# Patient Record
Sex: MALE | Race: WHITE | NOT HISPANIC OR LATINO | URBAN - METROPOLITAN AREA
[De-identification: names, ages, dates, MRNs, and addresses within clinical notes are randomized per-mention and may not be internally consistent; named-entity substitution may affect disease eponyms.]

---

## 2019-01-01 ENCOUNTER — INPATIENT (INPATIENT)
Facility: HOSPITAL | Age: 79
LOS: 2 days | Discharge: HOME | End: 2019-01-04
Attending: SURGERY | Admitting: SURGERY
Payer: MEDICARE

## 2019-01-01 VITALS
SYSTOLIC BLOOD PRESSURE: 158 MMHG | TEMPERATURE: 99 F | HEART RATE: 62 BPM | OXYGEN SATURATION: 99 % | DIASTOLIC BLOOD PRESSURE: 79 MMHG | RESPIRATION RATE: 18 BRPM

## 2019-01-01 DIAGNOSIS — Z98.890 OTHER SPECIFIED POSTPROCEDURAL STATES: Chronic | ICD-10-CM

## 2019-01-01 LAB
ALBUMIN SERPL ELPH-MCNC: 4.4 G/DL — SIGNIFICANT CHANGE UP (ref 3.5–5.2)
ALP SERPL-CCNC: 74 U/L — SIGNIFICANT CHANGE UP (ref 30–115)
ALT FLD-CCNC: 20 U/L — SIGNIFICANT CHANGE UP (ref 0–41)
ANION GAP SERPL CALC-SCNC: 14 MMOL/L — SIGNIFICANT CHANGE UP (ref 7–14)
AST SERPL-CCNC: 24 U/L — SIGNIFICANT CHANGE UP (ref 0–41)
BILIRUB SERPL-MCNC: 1.2 MG/DL — SIGNIFICANT CHANGE UP (ref 0.2–1.2)
BUN SERPL-MCNC: 14 MG/DL — SIGNIFICANT CHANGE UP (ref 10–20)
CALCIUM SERPL-MCNC: 10.2 MG/DL — HIGH (ref 8.5–10.1)
CHLORIDE SERPL-SCNC: 101 MMOL/L — SIGNIFICANT CHANGE UP (ref 98–110)
CO2 SERPL-SCNC: 27 MMOL/L — SIGNIFICANT CHANGE UP (ref 17–32)
CREAT SERPL-MCNC: 1.3 MG/DL — SIGNIFICANT CHANGE UP (ref 0.7–1.5)
GLUCOSE SERPL-MCNC: 147 MG/DL — HIGH (ref 70–99)
HCT VFR BLD CALC: 45.3 % — SIGNIFICANT CHANGE UP (ref 42–52)
HGB BLD-MCNC: 15.7 G/DL — SIGNIFICANT CHANGE UP (ref 14–18)
LACTATE SERPL-SCNC: 2.1 MMOL/L — SIGNIFICANT CHANGE UP (ref 0.5–2.2)
LIDOCAIN IGE QN: 40 U/L — SIGNIFICANT CHANGE UP (ref 7–60)
MCHC RBC-ENTMCNC: 31.6 PG — HIGH (ref 27–31)
MCHC RBC-ENTMCNC: 34.7 G/DL — SIGNIFICANT CHANGE UP (ref 32–37)
MCV RBC AUTO: 91.1 FL — SIGNIFICANT CHANGE UP (ref 80–94)
NRBC # BLD: 0 /100 WBCS — SIGNIFICANT CHANGE UP (ref 0–0)
PLATELET # BLD AUTO: 199 K/UL — SIGNIFICANT CHANGE UP (ref 130–400)
POTASSIUM SERPL-MCNC: 4.9 MMOL/L — SIGNIFICANT CHANGE UP (ref 3.5–5)
POTASSIUM SERPL-SCNC: 4.9 MMOL/L — SIGNIFICANT CHANGE UP (ref 3.5–5)
PROT SERPL-MCNC: 7.6 G/DL — SIGNIFICANT CHANGE UP (ref 6–8)
RBC # BLD: 4.97 M/UL — SIGNIFICANT CHANGE UP (ref 4.7–6.1)
RBC # FLD: 12.6 % — SIGNIFICANT CHANGE UP (ref 11.5–14.5)
SODIUM SERPL-SCNC: 142 MMOL/L — SIGNIFICANT CHANGE UP (ref 135–146)
TROPONIN T SERPL-MCNC: <0.01 NG/ML — SIGNIFICANT CHANGE UP
TYPE + AB SCN PNL BLD: SIGNIFICANT CHANGE UP
WBC # BLD: 10.14 K/UL — SIGNIFICANT CHANGE UP (ref 4.8–10.8)
WBC # FLD AUTO: 10.14 K/UL — SIGNIFICANT CHANGE UP (ref 4.8–10.8)

## 2019-01-01 PROCEDURE — 99291 CRITICAL CARE FIRST HOUR: CPT

## 2019-01-01 RX ORDER — CHLORHEXIDINE GLUCONATE 213 G/1000ML
1 SOLUTION TOPICAL
Qty: 0 | Refills: 0 | Status: DISCONTINUED | OUTPATIENT
Start: 2019-01-01 | End: 2019-01-04

## 2019-01-01 RX ORDER — HYDROMORPHONE HYDROCHLORIDE 2 MG/ML
0.5 INJECTION INTRAMUSCULAR; INTRAVENOUS; SUBCUTANEOUS EVERY 4 HOURS
Qty: 0 | Refills: 0 | Status: DISCONTINUED | OUTPATIENT
Start: 2019-01-01 | End: 2019-01-02

## 2019-01-01 RX ORDER — SODIUM CHLORIDE 9 MG/ML
1000 INJECTION INTRAMUSCULAR; INTRAVENOUS; SUBCUTANEOUS
Qty: 0 | Refills: 0 | Status: DISCONTINUED | OUTPATIENT
Start: 2019-01-01 | End: 2019-01-02

## 2019-01-01 RX ORDER — PANTOPRAZOLE SODIUM 20 MG/1
40 TABLET, DELAYED RELEASE ORAL DAILY
Qty: 0 | Refills: 0 | Status: DISCONTINUED | OUTPATIENT
Start: 2019-01-01 | End: 2019-01-02

## 2019-01-01 RX ORDER — MORPHINE SULFATE 50 MG/1
4 CAPSULE, EXTENDED RELEASE ORAL ONCE
Qty: 0 | Refills: 0 | Status: DISCONTINUED | OUTPATIENT
Start: 2019-01-01 | End: 2019-01-01

## 2019-01-01 RX ORDER — ESMOLOL HCL 100MG/10ML
50 VIAL (ML) INTRAVENOUS
Qty: 2500 | Refills: 0 | Status: DISCONTINUED | OUTPATIENT
Start: 2019-01-01 | End: 2019-01-01

## 2019-01-01 RX ORDER — ESMOLOL HCL 100MG/10ML
50 VIAL (ML) INTRAVENOUS
Qty: 2500 | Refills: 0 | Status: DISCONTINUED | OUTPATIENT
Start: 2019-01-01 | End: 2019-01-02

## 2019-01-01 RX ORDER — HYDROMORPHONE HYDROCHLORIDE 2 MG/ML
1 INJECTION INTRAMUSCULAR; INTRAVENOUS; SUBCUTANEOUS EVERY 4 HOURS
Qty: 0 | Refills: 0 | Status: DISCONTINUED | OUTPATIENT
Start: 2019-01-01 | End: 2019-01-02

## 2019-01-01 RX ORDER — NICARDIPINE HYDROCHLORIDE 30 MG/1
5 CAPSULE, EXTENDED RELEASE ORAL
Qty: 40 | Refills: 0 | Status: DISCONTINUED | OUTPATIENT
Start: 2019-01-01 | End: 2019-01-01

## 2019-01-01 RX ADMIN — MORPHINE SULFATE 4 MILLIGRAM(S): 50 CAPSULE, EXTENDED RELEASE ORAL at 12:46

## 2019-01-01 RX ADMIN — SODIUM CHLORIDE 75 MILLILITER(S): 9 INJECTION INTRAMUSCULAR; INTRAVENOUS; SUBCUTANEOUS at 18:25

## 2019-01-01 RX ADMIN — HYDROMORPHONE HYDROCHLORIDE 1 MILLIGRAM(S): 2 INJECTION INTRAMUSCULAR; INTRAVENOUS; SUBCUTANEOUS at 20:56

## 2019-01-01 RX ADMIN — NICARDIPINE HYDROCHLORIDE 25 MG/HR: 30 CAPSULE, EXTENDED RELEASE ORAL at 17:00

## 2019-01-01 RX ADMIN — Medication 25.5 MICROGRAM(S)/KG/MIN: at 20:05

## 2019-01-01 RX ADMIN — MORPHINE SULFATE 4 MILLIGRAM(S): 50 CAPSULE, EXTENDED RELEASE ORAL at 15:38

## 2019-01-01 RX ADMIN — HYDROMORPHONE HYDROCHLORIDE 1 MILLIGRAM(S): 2 INJECTION INTRAMUSCULAR; INTRAVENOUS; SUBCUTANEOUS at 21:15

## 2019-01-01 NOTE — ED PROVIDER NOTE - MEDICAL DECISION MAKING DETAILS
[facility]dr [facility], recommend gabriel, esmolol drip, t+s, npo. will move to crit. Spoke w dr mujica who accepts pt to sicu. dr orantes consulted sicu pa/team. pt found to have focal aortic dissection vs penetrating ulcer aorta; pt seen by vasc team/dr martin, recommend gabriel, esmolol drip, t+s, npo. will move to crit. Spoke w dr mujica who accepts pt to sicu. dr orantes consulted sicu pa/team.

## 2019-01-01 NOTE — ED PROCEDURE NOTE - ATTENDING CONTRIBUTION TO CARE
I was present for and supervised the key/critical aspects of the procedures performed during the care of the patient.

## 2019-01-01 NOTE — ED PROCEDURE NOTE - NS ED ATTENDING STATEMENT MOD
I have personally seen and examined this patient.  I have fully participated in the care of this patient. I have reviewed all pertinent clinical information, including history, physical exam, plan and the Resident’s note and agree except as noted.

## 2019-01-01 NOTE — CONSULT NOTE ADULT - SUBJECTIVE AND OBJECTIVE BOX
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 Avila in 2012,  presents with 2 hours of substernal chest pain that radiated to his mid back.  Now still having midsternal CP, described as tight, squeezing. Assocaietd with epigastric pain. Worse with deep inspiration. Denies NVCD, SOB, diaphoresis, extremities pain or weakness, HA. CT w/ dissection protocol demonstrating   Focal outpouching of the descending thoracic aorta measuring up to 2.5 cm at the level of the T6 vertebral body with a descending thoracic aorta intramural hematoma. These findings are consistent with a penetrating atherosclerotic ulcer versus a focal aortic dissection. Hemodynamically stable VS in /89, HR 80.      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 Avila in 2012    Home Medications: None    Allergies: NKDA    Intolerances    Soc:   Advanced Directives: Presumed Full Code     VITAL SIGNS, INS/OUTS (last 24 hours):  --------------------------------------------------------------------------------------  ICU Vital Signs Last 24 Hrs  T(C): 36.4 (01 Jan 2019 15:33), Max: 37.2 (01 Jan 2019 11:53)  T(F): 97.5 (01 Jan 2019 15:33), Max: 98.9 (01 Jan 2019 11:53)  HR: 85 (01 Jan 2019 17:00) (62 - 85)  BP: 150/65 (01 Jan 2019 17:00) (146/89 - 177/92)  RR: 16 (01 Jan 2019 17:00) (16 - 20)  SpO2: 100% (01 Jan 2019 17:00) (99% - 100%)    I&O's Summary  -------------------------------------------------------------------------------------    EXAM:  General/Neuro  GCS: 15  Exam: Normal, NAD, alert, oriented x 3, no focal deficits. PERRLA    Respiratory  Exam: Lungs clear to auscultation, Normal expansion/effort.      Cardiovascular  Exam: S1, S2.  Regular rate and rhythm. No Peripheral edema     GI  Exam: Abdomen soft, Non-tender, Non-distended.   Current Diet:  NPO    Extremities  Exam: Extremities moves all, +equal distal pulses, brisk cap refill, sensation wnl, normal ROM. No LE edema, calves      Tubes/Lines/Drains  ***  [X] Peripheral IV  [X] Arterial Line		[x] R		[x] Rad	Date Placed:  1-1-19          LABS  --------------------------------------------------------------------------------------                        15.7   10.14 )-----------( 199      ( 01 Jan 2019 12:15 )             45.3     01-01    142  |  101  |  14  ----------------------------<  147<H>  4.9   |  27  |  1.3    Ca    10.2<H>      01 Jan 2019 12:15    TPro  7.6  /  Alb  4.4  /  TBili  1.2  /  DBili  x   /  AST  24  /  ALT  20  /  AlkPhos  74  01-01    LIVER FUNCTIONS - ( 01 Jan 2019 12:15 )  Alb: 4.4 g/dL / Pro: 7.6 g/dL / ALK PHOS: 74 U/L / ALT: 20 U/L / AST: 24 U/L / GGT: x           CARDIAC MARKERS ( 01 Jan 2019 12:15 )  x     / <0.01 ng/mL / x     / x     / x        IMAGING RESULTS    CT Angio Chest Dissection Protocol (01.01.19 @ 14:20) >    	IMPRESSION:    	*Focal outpouching of the descending thoracic aorta measuring up to 2.5   	cm at the level of the T6 vertebral body with a descending thoracic aorta   	intramural hematoma. These findings are consistent with a penetrating   	atherosclerotic ulcer versus a focal aortic dissection.    	Status postaortic root aneurysm repair, with diffuse aneurysmal changes   	of the intrathoracic and intra-abdominal aorta as measured above.    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 Avila in 2012,  presents with 2 hours of substernal chest pain that radiated to his mid back.  Now still having midsternal CP, described as tight, squeezing. CT w/ dissection protocol demonstrating Focal outpouching of the descending thoracic aorta measuring up to 2.5 cm at the level of the T6 consistent with a penetrating atherosclerotic ulcer.     PLAN:    Neurologic: AAO x3, Pain control w/ PRN morphine     Respiratory: Saturating well on RA.     Cardiovascular: [place] in place, started on esmolol for tight BP control w/ SBP goal of <130.  Troponin:   <0.01 (01-01-19 @ 12:15)    Gastrointestinal/Nutrition: NPO, NS @ 100. GI Prophylaxis w/ Protonix 40mg Daily.    Renal/Genitourinary: Strict I&O, Replete electrolytes PRN    Hematologic: Hb stable 15.7, Serial CBCs. F/U Coags.     Infectious Disease: No Acute Dx, afebrile, WBC 10    Endocrine: No Acute Dx, Monitor FS q6h     Musculoskeletal: Bedrest.    Lines/Tubes: PIV, Rt Rad [place]    Disposition: Admit to SICU

## 2019-01-01 NOTE — H&P ADULT - NSHPPHYSICALEXAM_GEN_ALL_CORE
PHYSICAL EXAM:  GENERAL: NAD, sitting up in bed  HEENT: Normocephalic, atraumatic  CHEST/LUNG: Bilateral breath sounds  HEART: Regular rate and rhythm, nontender to chest wall palpation  ABDOMEN: Soft, Nondistended, Nontender  EXTREMITIES:  Moving all extremities, pulses throughout, no deformities

## 2019-01-01 NOTE — ED PROVIDER NOTE - PROGRESS NOTE DETAILS
FRANCHESCA CT surgery [place], states this is a vascualr issue. FRANCHESCA vascualr surgery, Deng. Aware of patient. OTW to see. spoke w dr godinez, will let dr calvo know pt. pt and family updated on results of CT and need for surgery eval FRANCHESCA fang, admission to SICU, NPO, type and screen, large bore IVs. BP control 100-130. Confirmed with Dr. Vilchis of vascular, wants BP control prioritized over HR. Will use Cardene.

## 2019-01-01 NOTE — ED PROVIDER NOTE - PHYSICAL EXAMINATION
AOx4, Non toxic appearing, NAD, speaking in full sentences.   Skin - warm and dry, no acute rash.   Head - normocephalic, atraumatic.   Eyes - PERRLA/EOMI, conjunctiva and sclera clear.   ENT- MM moist, no nasal discharge.  Pharynx unremarkable.  No mastoid or temporal ttp.   Neck - supple nt, no meningeal signs.   Heart - RRR s1s2 nl, no rub/murmur.   Lungs- No retractions, BS equal, CTAB.   Abdomen - soft, ttp in epigastrium, nd no r/g.   Back- no CVA tenderness.  Extremities- moves all, +equal distal pulses, brisk cap refill, sensation wnl, normal ROM. No LE edema, calves nttp b/l.

## 2019-01-01 NOTE — ED PROVIDER NOTE - ATTENDING CONTRIBUTION TO CARE
78M PMH aortic aneurysm repair 2012 dr valenzuela, p/w acute onset mid back pain that radiated to abdomen and then to chest. back and abd pain resolved, now only has chest pain. sharp constant pain. started when he layed down in bed. no fever, cough. no trauma. no le edema, le pain, hormones, hemoptysis, immobilization. last stress 2012 neg dr calvo cards. wife states pt was sweating when symptoms started. pain started 930am and they called neighbor who is an RN who checked vitals and told them to come to ED. pain is also present in epigastrium. no nvdc. no dysuria, freq, hematuria. no ha, numbness, weakness, dizziness.     on exam, AFVSS, well bradny nad, ncat, eomi, perrla, mmm, lctab, rrr nl s1s2 no mrg, abd soft mild epigastric/ruq ttp, no rebound or rigidity, nd, aaox3, no focal deficits, no midline c/t/l spine ttp or step off, normal gait, cn 2-12 intact, 5/5 motor x 4 ext, silt x4 ext, no facial droop or slurred speech, no le edema or calf ttp,     a/p; concern for aortic dissection vs acs, will do labs, ekg/trop, CXR, CTA, hold asa until after CT. morphine for pain control. re-eval. H&P not consistent w pe, pna, esoph perf, tamponade, ptx. will also do bedside sono r/o cholecystitis, r/o pericardial effusion, r/o free fluid in abd.

## 2019-01-01 NOTE — H&P ADULT - HISTORY OF PRESENT ILLNESS
Patient is a 79 yo M with PMH of AV repair with Dr. Avila in 2012 who presented today with 2 hours of substernal chest pain that radiated around from his mid back when it started and is now epigastric/substernal. He denies any SOB, diaphoresis, nausea, vomiting, or dizziness.

## 2019-01-01 NOTE — ED PROVIDER NOTE - OBJECTIVE STATEMENT
79 yo M with a hx of aortic root aneurysm s/p AV replacement presents with sudden onset chest pain. Patient states that 1-2 hours prior to arrival he turned over in bed and had sudden pain shooting from the mid back through to his chest. Now still having midsternal CP, described as tight, squeezing. Assocaietd with epigastric pain. Worse with deep inspiration. Denies NVCD, SOB, diaphoresis, extremities pain or weakness, HA.

## 2019-01-01 NOTE — H&P ADULT - ASSESSMENT
78 year old male with h/o AV repair in 2012 presents with 2 hours of chest pain with CT findings of penetrating aortic ulcer. He is amenable to treatment and will be admitted for observation and possible intervention if symptoms persist    Plan:  Admit to Dr. Don  Admit to ICU  2 large bore IVs  Aggressive blood pressure control with goals of 100-130 systolics   NPO   Labs including type and screen  Place arterial line

## 2019-01-01 NOTE — CONSULT NOTE ADULT - CONSULT REASON
Hemodynamic monitoring and BP control for descending thoracic aortic penetrating atherosclerotic ulcer

## 2019-01-01 NOTE — H&P ADULT - ATTENDING COMMENTS
Pt seen and examined, CTA reviewed.  C/O anterior chest and upper abdominal pain.  Hx MV repair.  CTA shows IMH with penetrating thoracic aortic ulcer, age undetermined.  Will admit to ICU for BP control and close observation.  Unclear whether pain is due to the aortic ulceration.  If pain persists will likely have to treat the patient with a TAG.

## 2019-01-01 NOTE — H&P ADULT - NSHPLABSRESULTS_GEN_ALL_CORE
Labs:                       15.7   10.14 )-----------( 199      ( 01 Jan 2019 12:15 )             45.3         01-01    142  |  101  |  14  ----------------------------<  147<H>  4.9   |  27  |  1.3      Calcium, Total Serum: 10.2 mg/dL (01-01-19 @ 12:15)    LFTs:             7.6  | 1.2  | 24       ------------------[74      ( 01 Jan 2019 12:15 )  4.4  | x    | 20          Lipase:40     Amylase:x        Lactate, Blood: 2.1 mmol/L (01-01-19 @ 12:15)    Coags:    CARDIAC MARKERS ( 01 Jan 2019 12:15 )  x     / <0.01 ng/mL / x     / x     / x            < from: CT Angio Chest Dissection Protocol (01.01.19 @ 14:20) >    IMPRESSION:    *Focal outpouching of the descending thoracic aorta measuring up to 2.5   cm at the level of the T6 vertebral body with a descending thoracic aorta   intramural hematoma. These findings are consistent with a penetrating   atherosclerotic ulcer versus a focal aortic dissection.    Status postaortic root aneurysm repair, with diffuse aneurysmal changes   of the intrathoracic and intra-abdominal aorta as measured above.    < end of copied text >

## 2019-01-02 LAB
ANION GAP SERPL CALC-SCNC: 13 MMOL/L — SIGNIFICANT CHANGE UP (ref 7–14)
ANION GAP SERPL CALC-SCNC: 15 MMOL/L — HIGH (ref 7–14)
APTT BLD: 29 SEC — SIGNIFICANT CHANGE UP (ref 27–39.2)
APTT BLD: 32.9 SEC — SIGNIFICANT CHANGE UP (ref 27–39.2)
BASOPHILS # BLD AUTO: 0.03 K/UL — SIGNIFICANT CHANGE UP (ref 0–0.2)
BASOPHILS NFR BLD AUTO: 0.3 % — SIGNIFICANT CHANGE UP (ref 0–1)
BUN SERPL-MCNC: 14 MG/DL — SIGNIFICANT CHANGE UP (ref 10–20)
BUN SERPL-MCNC: 16 MG/DL — SIGNIFICANT CHANGE UP (ref 10–20)
CALCIUM SERPL-MCNC: 8.6 MG/DL — SIGNIFICANT CHANGE UP (ref 8.5–10.1)
CALCIUM SERPL-MCNC: 9.2 MG/DL — SIGNIFICANT CHANGE UP (ref 8.5–10.1)
CHLORIDE SERPL-SCNC: 101 MMOL/L — SIGNIFICANT CHANGE UP (ref 98–110)
CHLORIDE SERPL-SCNC: 98 MMOL/L — SIGNIFICANT CHANGE UP (ref 98–110)
CO2 SERPL-SCNC: 25 MMOL/L — SIGNIFICANT CHANGE UP (ref 17–32)
CO2 SERPL-SCNC: 25 MMOL/L — SIGNIFICANT CHANGE UP (ref 17–32)
CREAT SERPL-MCNC: 1.3 MG/DL — SIGNIFICANT CHANGE UP (ref 0.7–1.5)
CREAT SERPL-MCNC: 1.3 MG/DL — SIGNIFICANT CHANGE UP (ref 0.7–1.5)
EOSINOPHIL # BLD AUTO: 0 K/UL — SIGNIFICANT CHANGE UP (ref 0–0.7)
EOSINOPHIL NFR BLD AUTO: 0 % — SIGNIFICANT CHANGE UP (ref 0–8)
GLUCOSE SERPL-MCNC: 124 MG/DL — HIGH (ref 70–99)
GLUCOSE SERPL-MCNC: 125 MG/DL — HIGH (ref 70–99)
HCT VFR BLD CALC: 38.2 % — LOW (ref 42–52)
HCT VFR BLD CALC: 41.4 % — LOW (ref 42–52)
HGB BLD-MCNC: 12.9 G/DL — LOW (ref 14–18)
HGB BLD-MCNC: 14.1 G/DL — SIGNIFICANT CHANGE UP (ref 14–18)
IMM GRANULOCYTES NFR BLD AUTO: 0.4 % — HIGH (ref 0.1–0.3)
INR BLD: 1.2 RATIO — SIGNIFICANT CHANGE UP (ref 0.65–1.3)
INR BLD: 1.35 RATIO — HIGH (ref 0.65–1.3)
LYMPHOCYTES # BLD AUTO: 1.19 K/UL — LOW (ref 1.2–3.4)
LYMPHOCYTES # BLD AUTO: 11.6 % — LOW (ref 20.5–51.1)
MAGNESIUM SERPL-MCNC: 1.8 MG/DL — SIGNIFICANT CHANGE UP (ref 1.8–2.4)
MAGNESIUM SERPL-MCNC: 2.1 MG/DL — SIGNIFICANT CHANGE UP (ref 1.8–2.4)
MCHC RBC-ENTMCNC: 31.2 PG — HIGH (ref 27–31)
MCHC RBC-ENTMCNC: 31.2 PG — HIGH (ref 27–31)
MCHC RBC-ENTMCNC: 33.8 G/DL — SIGNIFICANT CHANGE UP (ref 32–37)
MCHC RBC-ENTMCNC: 34.1 G/DL — SIGNIFICANT CHANGE UP (ref 32–37)
MCV RBC AUTO: 91.6 FL — SIGNIFICANT CHANGE UP (ref 80–94)
MCV RBC AUTO: 92.5 FL — SIGNIFICANT CHANGE UP (ref 80–94)
MONOCYTES # BLD AUTO: 0.82 K/UL — HIGH (ref 0.1–0.6)
MONOCYTES NFR BLD AUTO: 8 % — SIGNIFICANT CHANGE UP (ref 1.7–9.3)
NEUTROPHILS # BLD AUTO: 8.22 K/UL — HIGH (ref 1.4–6.5)
NEUTROPHILS NFR BLD AUTO: 79.7 % — HIGH (ref 42.2–75.2)
NRBC # BLD: 0 /100 WBCS — SIGNIFICANT CHANGE UP (ref 0–0)
PHOSPHATE SERPL-MCNC: 2.4 MG/DL — SIGNIFICANT CHANGE UP (ref 2.1–4.9)
PHOSPHATE SERPL-MCNC: 3.2 MG/DL — SIGNIFICANT CHANGE UP (ref 2.1–4.9)
PLATELET # BLD AUTO: 156 K/UL — SIGNIFICANT CHANGE UP (ref 130–400)
PLATELET # BLD AUTO: 172 K/UL — SIGNIFICANT CHANGE UP (ref 130–400)
POTASSIUM SERPL-MCNC: 4.2 MMOL/L — SIGNIFICANT CHANGE UP (ref 3.5–5)
POTASSIUM SERPL-MCNC: 4.5 MMOL/L — SIGNIFICANT CHANGE UP (ref 3.5–5)
POTASSIUM SERPL-SCNC: 4.2 MMOL/L — SIGNIFICANT CHANGE UP (ref 3.5–5)
POTASSIUM SERPL-SCNC: 4.5 MMOL/L — SIGNIFICANT CHANGE UP (ref 3.5–5)
PROTHROM AB SERPL-ACNC: 13.8 SEC — HIGH (ref 9.95–12.87)
PROTHROM AB SERPL-ACNC: 15.5 SEC — HIGH (ref 9.95–12.87)
RBC # BLD: 4.13 M/UL — LOW (ref 4.7–6.1)
RBC # BLD: 4.52 M/UL — LOW (ref 4.7–6.1)
RBC # FLD: 12.7 % — SIGNIFICANT CHANGE UP (ref 11.5–14.5)
RBC # FLD: 12.8 % — SIGNIFICANT CHANGE UP (ref 11.5–14.5)
SODIUM SERPL-SCNC: 136 MMOL/L — SIGNIFICANT CHANGE UP (ref 135–146)
SODIUM SERPL-SCNC: 141 MMOL/L — SIGNIFICANT CHANGE UP (ref 135–146)
WBC # BLD: 10.3 K/UL — SIGNIFICANT CHANGE UP (ref 4.8–10.8)
WBC # BLD: 10.31 K/UL — SIGNIFICANT CHANGE UP (ref 4.8–10.8)
WBC # FLD AUTO: 10.3 K/UL — SIGNIFICANT CHANGE UP (ref 4.8–10.8)
WBC # FLD AUTO: 10.31 K/UL — SIGNIFICANT CHANGE UP (ref 4.8–10.8)

## 2019-01-02 PROCEDURE — 99233 SBSQ HOSP IP/OBS HIGH 50: CPT

## 2019-01-02 RX ORDER — SODIUM CHLORIDE 9 MG/ML
1000 INJECTION, SOLUTION INTRAVENOUS
Qty: 0 | Refills: 0 | Status: DISCONTINUED | OUTPATIENT
Start: 2019-01-02 | End: 2019-01-03

## 2019-01-02 RX ORDER — LABETALOL HCL 100 MG
100 TABLET ORAL EVERY 8 HOURS
Qty: 0 | Refills: 0 | Status: DISCONTINUED | OUTPATIENT
Start: 2019-01-02 | End: 2019-01-02

## 2019-01-02 RX ORDER — OXYCODONE HYDROCHLORIDE 5 MG/1
5 TABLET ORAL EVERY 6 HOURS
Qty: 0 | Refills: 0 | Status: DISCONTINUED | OUTPATIENT
Start: 2019-01-02 | End: 2019-01-04

## 2019-01-02 RX ORDER — PANTOPRAZOLE SODIUM 20 MG/1
40 TABLET, DELAYED RELEASE ORAL
Qty: 0 | Refills: 0 | Status: DISCONTINUED | OUTPATIENT
Start: 2019-01-02 | End: 2019-01-04

## 2019-01-02 RX ORDER — LABETALOL HCL 100 MG
100 TABLET ORAL ONCE
Qty: 0 | Refills: 0 | Status: COMPLETED | OUTPATIENT
Start: 2019-01-02 | End: 2019-01-02

## 2019-01-02 RX ORDER — OXYCODONE HYDROCHLORIDE 5 MG/1
10 TABLET ORAL EVERY 6 HOURS
Qty: 0 | Refills: 0 | Status: DISCONTINUED | OUTPATIENT
Start: 2019-01-02 | End: 2019-01-04

## 2019-01-02 RX ORDER — LABETALOL HCL 100 MG
100 TABLET ORAL EVERY 8 HOURS
Qty: 0 | Refills: 0 | Status: DISCONTINUED | OUTPATIENT
Start: 2019-01-02 | End: 2019-01-03

## 2019-01-02 RX ORDER — ACETAMINOPHEN 500 MG
650 TABLET ORAL EVERY 6 HOURS
Qty: 0 | Refills: 0 | Status: DISCONTINUED | OUTPATIENT
Start: 2019-01-02 | End: 2019-01-04

## 2019-01-02 RX ORDER — METOPROLOL TARTRATE 50 MG
5 TABLET ORAL ONCE
Qty: 0 | Refills: 0 | Status: COMPLETED | OUTPATIENT
Start: 2019-01-02 | End: 2019-01-02

## 2019-01-02 RX ORDER — MAGNESIUM SULFATE 500 MG/ML
2 VIAL (ML) INJECTION ONCE
Qty: 0 | Refills: 0 | Status: COMPLETED | OUTPATIENT
Start: 2019-01-02 | End: 2019-01-02

## 2019-01-02 RX ORDER — LABETALOL HCL 100 MG
200 TABLET ORAL EVERY 8 HOURS
Qty: 0 | Refills: 0 | Status: DISCONTINUED | OUTPATIENT
Start: 2019-01-02 | End: 2019-01-02

## 2019-01-02 RX ADMIN — HYDROMORPHONE HYDROCHLORIDE 0.5 MILLIGRAM(S): 2 INJECTION INTRAMUSCULAR; INTRAVENOUS; SUBCUTANEOUS at 04:15

## 2019-01-02 RX ADMIN — OXYCODONE HYDROCHLORIDE 10 MILLIGRAM(S): 5 TABLET ORAL at 12:00

## 2019-01-02 RX ADMIN — Medication 100 MILLIGRAM(S): at 11:05

## 2019-01-02 RX ADMIN — OXYCODONE HYDROCHLORIDE 10 MILLIGRAM(S): 5 TABLET ORAL at 17:50

## 2019-01-02 RX ADMIN — HYDROMORPHONE HYDROCHLORIDE 0.5 MILLIGRAM(S): 2 INJECTION INTRAMUSCULAR; INTRAVENOUS; SUBCUTANEOUS at 03:55

## 2019-01-02 RX ADMIN — OXYCODONE HYDROCHLORIDE 10 MILLIGRAM(S): 5 TABLET ORAL at 11:05

## 2019-01-02 RX ADMIN — Medication 50 GRAM(S): at 06:58

## 2019-01-02 RX ADMIN — PANTOPRAZOLE SODIUM 40 MILLIGRAM(S): 20 TABLET, DELAYED RELEASE ORAL at 13:36

## 2019-01-02 RX ADMIN — OXYCODONE HYDROCHLORIDE 10 MILLIGRAM(S): 5 TABLET ORAL at 18:43

## 2019-01-02 RX ADMIN — OXYCODONE HYDROCHLORIDE 5 MILLIGRAM(S): 5 TABLET ORAL at 22:31

## 2019-01-02 RX ADMIN — Medication 5 MILLIGRAM(S): at 22:20

## 2019-01-02 RX ADMIN — OXYCODONE HYDROCHLORIDE 5 MILLIGRAM(S): 5 TABLET ORAL at 21:42

## 2019-01-02 RX ADMIN — Medication 100 MILLIGRAM(S): at 13:35

## 2019-01-02 RX ADMIN — Medication 100 MILLIGRAM(S): at 21:05

## 2019-01-02 RX ADMIN — CHLORHEXIDINE GLUCONATE 1 APPLICATION(S): 213 SOLUTION TOPICAL at 06:59

## 2019-01-02 NOTE — PROGRESS NOTE ADULT - ASSESSMENT
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 Avila in 2012,  presents with 2 hours of substernal chest pain that radiated to his mid back.  Now still having midsternal CP, described as tight, squeezing. CT w/ dissection protocol demonstrating Focal outpouching of the descending thoracic aorta measuring up to 2.5 cm at the level of the T6 consistent with a penetrating atherosclerotic ulcer.     PLAN:    Neurologic: AAO x3, Pain control w/ PRN morphine     Respiratory: Saturating well on RA.     Cardiovascular: [place] in place, started on esmolol for tight BP control w/ SBP goal of <130.  Troponin:   <0.01 (01-01-19 @ 12:15)    Gastrointestinal/Nutrition: NPO, NS @ 100. GI Prophylaxis w/ Protonix 40mg Daily.    Renal/Genitourinary: Strict I&O, Replete electrolytes PRN    Hematologic: Hb stable 15.7, Serial CBCs. F/U Coags.     Infectious Disease: No Acute Dx, afebrile, WBC 10    Endocrine: No Acute Dx, Monitor FS q6h     Musculoskeletal: Bedrest.    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 Avila in 2012,  presents with 2 hours of substernal chest pain that radiated to his mid back.  Now still having midsternal CP, described as tight, squeezing. CT w/ dissection protocol demonstrating Focal outpouching of the descending thoracic aorta measuring up to 2.5 cm at the level of the T6 consistent with a penetrating atherosclerotic ulcer.     PLAN:    Neurologic: AAO x3, Pain control w/ PRN morphine     Respiratory: Saturating well on RA.     Cardiovascular: [place] in place, started on esmolol for tight BP control w/ SBP goal of <130. will start labetalol and wean esmolol  Troponin:   <0.01 (01-01-19 @ 12:15)    Gastrointestinal/Nutrition: NPO, NS @ 100. GI Prophylaxis w/ Protonix 40mg Daily.    Renal/Genitourinary: Strict I&O, Replete electrolytes PRN    Hematologic: Hb stable 15.7, Serial CBCs. F/U Coags.     Infectious Disease: No Acute Dx, afebrile, WBC 10    Endocrine: No Acute Dx, Monitor FS q6h     Musculoskeletal: Bedrest.    Lines/Tubes: PIV, Rt Noam Mahoney    Disposition: Continue SICU

## 2019-01-02 NOTE — CONSULT NOTE ADULT - ASSESSMENT
chest pain due to aortic pathology, penetrating ulcer and small area of descending aortic dissection, asymptomatic now  no evidence of ACS  latest surgery was TAA repair and AVR tolerated    statins, aggressive control of BP  possibly aortogram and endovascular repair, will f/u vascular surgery note  I spoke to ICU team in the morning at 9 am, agreed with switching to Labetalol 100 tid, Statins, Vascular surgery f/u  if BP is not below 120 mmhg then will add ARB like Losartan

## 2019-01-02 NOTE — PROGRESS NOTE ADULT - SUBJECTIVE AND OBJECTIVE BOX
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 Avila in 2012,  presents with 2 hours of substernal chest pain that radiated to his mid back.  Now still having midsternal CP, described as tight, squeezing. Assocaietd with epigastric pain. Worse with deep inspiration. Denies NVCD, SOB, diaphoresis, extremities pain or weakness, HA. CT w/ dissection protocol demonstrating   Focal outpouching of the descending thoracic aorta measuring up to 2.5 cm at the level of the T6 vertebral body with a descending thoracic aorta intramural hematoma. These findings are consistent with a penetrating atherosclerotic ulcer versus a focal aortic dissection. Hemodynamically stable VS in /89, HR 80.    Neurologic: AAO x3, Pain control w/ PRN morphine   Respiratory: Saturating well on RA. 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  Gastrointestinal/Nutrition: NPO, NS @ 100. GI Prophylaxis w/ Protonix 40mg Daily.  Renal/Genitourinary: Strict I&O, Replete electrolytes PRN  Hematologic: Hb stable 15.7, Serial CBCs. F/U Coags.   Infectious Disease: No Acute Dx, afebrile, WBC 10  Endocrine: No Acute Dx, Monitor FS q6h   Musculoskeletal: Bedrest.    DVT Prophylaxis: SCD's  GI Prophylaxis: Protonix 40mg Daily     *** Tubes/Lines/Drains  ***  [X] Peripheral IV  [X] Arterial Line		[x] R		[x] Rad	Date Placed:  1-1-19      REVIEW OF SYSTEMS    [x] A ten-point review of systems was otherwise negative except as noted.  [ ] Due to altered mental status/intubation, subjective information were not able to be obtained from the patient. History was obtained, to the extent possible, from review of the chart and collateral sources of information. 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 Avila in 2012,  presents with 2 hours of substernal chest pain that radiated to his mid back.  Now still having midsternal CP, described as tight, squeezing. Assocaietd with epigastric pain. Worse with deep inspiration. Denies NVCD, SOB, diaphoresis, extremities pain or weakness, HA. CT w/ dissection protocol demonstrating   Focal outpouching of the descending thoracic aorta measuring up to 2.5 cm at the level of the T6 vertebral body with a descending thoracic aorta intramural hematoma. These findings are consistent with a penetrating atherosclerotic ulcer versus a focal aortic dissection. Hemodynamically stable VS in /89, HR 80.    Neurologic: AAO x3, Pain control w/ PRN morphine   Respiratory: Saturating well on RA. 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  Gastrointestinal/Nutrition: NPO, NS @ 100. GI Prophylaxis w/ Protonix 40mg Daily.  Renal/Genitourinary: Strict I&O, Replete electrolytes PRN  Hematologic: Hb stable 15.7, Serial CBCs. F/U Coags.   Infectious Disease: No Acute Dx, afebrile, WBC 10  Endocrine: No Acute Dx, Monitor FS q6h   Musculoskeletal: Bedrest.    DVT Prophylaxis: SCD's  GI Prophylaxis: Protonix 40mg Daily     *** Tubes/Lines/Drains  ***  [X] Peripheral IV  [X] Arterial Line		[x] R		[x] Rad	Date Placed:  1-1-19      REVIEW OF SYSTEMS    [x] A ten-point review of systems was otherwise negative except as noted.  [ ] Due to altered mental status/intubation, subjective information were not able to be obtained from the patient. History was obtained, to the extent possible, from review of the chart and collateral sources of information.    Daily Height in cm: 170.18 (01 Jan 2019 20:31)    Daily     Diet, DASH/TLC:   Sodium & Cholesterol Restricted (01-02-19 @ 09:54)      CURRENT MEDS:  Neurologic Medications  acetaminophen   Tablet .. 650 milliGRAM(s) Oral every 6 hours PRN Mild Pain (1 - 3)  oxyCODONE    IR 5 milliGRAM(s) Oral every 6 hours PRN Moderate Pain (4 - 6)  oxyCODONE    IR 10 milliGRAM(s) Oral every 6 hours PRN Severe Pain (7 - 10)    Respiratory Medications    Cardiovascular Medications  esMOLOL  Infusion 50 MICROgram(s)/kG/Min IV Continuous <Continuous>  labetalol 100 milliGRAM(s) Oral every 8 hours  labetalol 100 milliGRAM(s) Oral once    Gastrointestinal Medications  pantoprazole  Injectable 40 milliGRAM(s) IV Push daily    Genitourinary Medications    Hematologic/Oncologic Medications    Antimicrobial/Immunologic Medications    Endocrine/Metabolic Medications    Topical/Other Medications  chlorhexidine 4% Liquid 1 Application(s) Topical <User Schedule>    ICU Vital Signs Last 24 Hrs  T(C): 36.9 (02 Jan 2019 08:15), Max: 37.4 (01 Jan 2019 20:31)  T(F): 98.5 (02 Jan 2019 08:15), Max: 99.4 (01 Jan 2019 20:31)  HR: 70 (02 Jan 2019 09:30) (62 - 94)  BP: 113/65 (01 Jan 2019 20:31) (113/65 - 177/92)  BP(mean): 83 (01 Jan 2019 20:31) (83 - 83)  ABP: 126/54 (02 Jan 2019 09:30) (108/50 - 128/56)  ABP(mean): 80 (02 Jan 2019 09:30) (70 - 90)  RR: 25 (02 Jan 2019 09:30) (16 - 35)  SpO2: 95% (02 Jan 2019 09:30) (91% - 100%)    I&O's Summary    01 Jan 2019 07:01  -  02 Jan 2019 07:00  --------------------------------------------------------  IN: 1155 mL / OUT: 800 mL / NET: 355 mL      I&O's Detail    01 Jan 2019 07:01  -  02 Jan 2019 07:00  --------------------------------------------------------  IN:    esmolol Infusion: 330 mL    sodium chloride 0.9%: 825 mL  Total IN: 1155 mL    OUT:    Voided: 800 mL  Total OUT: 800 mL    Total NET: 355 mL    LABS:  CAPILLARY BLOOD GLUCOSE               14.1   10.30 )-----------( 172      ( 02 Jan 2019 00:21 )             41.4       01-02    141  |  101  |  14  ----------------------------<  124<H>  4.5   |  25  |  1.3    Ca    9.2      02 Jan 2019 00:21  Phos  3.2     01-02  Mg     1.8     01-02    TPro  7.6  /  Alb  4.4  /  TBili  1.2  /  DBili  x   /  AST  24  /  ALT  20  /  AlkPhos  74  01-01      PT/INR - ( 02 Jan 2019 00:21 )   PT: 13.80 sec;   INR: 1.20 ratio         PTT - ( 02 Jan 2019 00:21 )  PTT:32.9 sec  CARDIAC MARKERS ( 01 Jan 2019 12:15 )  x     / <0.01 ng/mL / x     / x     / x

## 2019-01-02 NOTE — PROGRESS NOTE ADULT - SUBJECTIVE AND OBJECTIVE BOX
GENERAL SURGERY PROGRESS NOTE     KAYLA SHELLEY  [address] [place] [address] day :1d   Surgical Attending: Atif Don  Overnight events: Patient feels improved from earlier presentation, reports that chest pain has decreased, is now intermittent. Primary complaint at this time is that he's feeling hungry.     T(F): 98.5 (01-02-19 @ 08:15), Max: 99.4 (01-01-19 @ 20:31)  HR: 66 (01-02-19 @ 08:15) (62 - 94)  BP: 113/65 (01-01-19 @ 20:31) (113/65 - 177/92)  ABP: 120/50 (01-02-19 @ 08:15) (108/50 - 128/56)  ABP(mean): 76 (01-02-19 @ 08:15) (70 - 90)  RR: 22 (01-02-19 @ 08:15) (16 - 35)  SpO2: 93% (01-02-19 @ 08:15) (91% - 100%)      01-01-19 @ 07:01  -  01-02-19 @ 07:00  --------------------------------------------------------  IN:    esmolol Infusion: 330 mL    sodium chloride 0.9%.: 825 mL  Total IN: 1155 mL    OUT:    Voided: 800 mL  Total OUT: 800 mL    Total NET: 355 mL        DIET/FLUIDS: sodium chloride 0.9%. 1000 milliLiter(s) IV Continuous <Continuous>      URINE:    Indwelling Urethral Catheter:     Connect To:  Straight Drainage/Gravity    Indication:  Urine Output Monitoring in Critically Ill    Additional Instructions:  DO NOT REMOVE without a discontinuation order (01-01-19 @ 17:44)    GI proph:  pantoprazole  Injectable 40 milliGRAM(s) IV Push daily      PHYSICAL EXAM:  GENERAL: NAD, well-appearing, nasal cannula in place  CHEST/LUNG: Clear to auscultation bilaterally  HEART: Regular rate and rhythm  ABDOMEN: Soft, Nontender, Nondistended;         LABS  Labs:  CAPILLARY BLOOD GLUCOSE                              14.1   10.30 )-----------( 172      ( 02 Jan 2019 00:21 )             41.4       Auto Neutrophil %: 79.7 % (01-02-19 @ 00:21)  Auto Immature Granulocyte %: 0.4 % (01-02-19 @ 00:21)    01-02    141  |  101  |  14  ----------------------------<  124<H>  4.5   |  25  |  1.3      Calcium, Total Serum: 9.2 mg/dL (01-02-19 @ 00:21)      LFTs:             7.6  | 1.2  | 24       ------------------[74      ( 01 Jan 2019 12:15 )  4.4  | x    | 20          Lipase:40     Amylase:x         Lactate, Blood: 2.1 mmol/L (01-01-19 @ 12:15)      Coags:     13.80  ----< 1.20    ( 02 Jan 2019 00:21 )     32.9        CARDIAC MARKERS ( 01 Jan 2019 12:15 )  x     / <0.01 ng/mL / x     / x     / x            RADIOLOGY & ADDITIONAL TESTS:  < from: CT Angio Chest Dissection Protocol (01.01.19 @ 14:20) >  Focal outpouching of the descending thoracic aorta measuring up to 2.5   cm at the level of the T6 vertebral body with a descending thoracic aorta   intramural hematoma. These findings are consistent with a penetrating   atherosclerotic ulcer versus a focal aortic dissection.    Status postaortic root aneurysm repair, with diffuse aneurysmal changes   of the intrathoracic and intra-abdominal aorta as measured above.    < end of copied text >

## 2019-01-02 NOTE — PROGRESS NOTE ADULT - ASSESSMENT
A/P:  KAYLA SHELLEY is a 78yMale HD1 with chest pain and CT findings of focal outpouching of the descending thoracic aorta measuring up to 2.5 cm at the level of the T6 vertebral body with a descending thoracic aorta intramural hematoma    Plan:   -need workup for cardiac etiology of chest pain (echo, EKG, cardiac enzymes)  -cardiology consult  -repeat CTA chest dissection protocol, if stable, then likely nonvascular etiology of current presentation

## 2019-01-02 NOTE — CHART NOTE - NSCHARTNOTEFT_GEN_A_CORE
Received a phone call from radiologist Dr. Cherry with feedback from patient's CTA     IMPRESSION:    1.  Post repair of aortic root aneurysm with stable enlargement of the   distal ascending aorta, aortic arch, descending thoracic aorta and   abdominal aorta.    2.  Unchanged Descending Penetrating Thoracic Aortic Ulcer with   Intramural hematoma .    3.  New bilateral left pleural effusion and increasing left para-aortic   atelectasis.    4.  Left perihilar 1.7 cm nodule.     Radiologist was concerned and thought that the patient should go to the OR. SICU attending was notified, Dr Pelaez said to notify vascular team. Called vascular team and said they would notify vascular fellow. Received a phone call from radiologist Dr. Cherry with feedback from patient's CTA     IMPRESSION:    1.  Post repair of aortic root aneurysm with stable enlargement of the   distal ascending aorta, aortic arch, descending thoracic aorta and   abdominal aorta.    2.  Unchanged Descending Penetrating Thoracic Aortic Ulcer with   Intramural hematoma .    3.  New bilateral left pleural effusion and increasing left para-aortic   atelectasis.    4.  Left perihilar 1.7 cm nodule.     Radiologist was concerned and thought that the patient should go to the OR. SICU attending was notified, Dr Pelaez said to notify vascular team. Called vascular team (spoke with LUIS Wallsi) said they would notify vascular fellow.

## 2019-01-02 NOTE — CONSULT NOTE ADULT - SUBJECTIVE AND OBJECTIVE BOX
Patient is a 78y old  Male who presents with a chief complaint of chest pain    HPI:  Known patient to me, long time Hx of heavy smoking, long hx of aortic aneurism, at a point TAA of 7 cm managed by repair, presented with chest pain, a small dissection and penetrating ulcer and areas of descending and abdominal aortic aneurism detected. He is asymptomatic now, no evidence of ACS    77 yo M with PMH of AV repair with Dr. Avila in 2012 presented with 2 hours of substernal chest pain that radiated around from his mid back to epigastric/substernal are. He denies any SOB, diaphoresis, nausea, vomiting, or dizziness. (01 Jan 2019 16:08)      PAST MEDICAL & SURGICAL HISTORY: TAA, ANEURISM REPAIR  History of aortic valve repair      PREVIOUS DIAGNOSTIC TESTING:      ECHO  FINDINGS: in the office 2017: EF 50-55%, mild LVH, mild diastolic dysfunction, mild MR, mild AI, systolic PA pressure 30 mmHg.    STRESS TEST  FINDINGS:     CATHETERIZATION  FINDINGS: in 2012 normal coronary arteries    MEDICATIONS  (STANDING):  chlorhexidine 4% Liquid 1 Application(s) Topical <User Schedule>  dextrose 5% + sodium chloride 0.9%. 1000 milliLiter(s) (75 mL/Hr) IV Continuous <Continuous>  labetalol 100 milliGRAM(s) Oral every 8 hours  pantoprazole    Tablet 40 milliGRAM(s) Oral before breakfast    MEDICATIONS  (PRN):  acetaminophen   Tablet .. 650 milliGRAM(s) Oral every 6 hours PRN Mild Pain (1 - 3)  oxyCODONE    IR 5 milliGRAM(s) Oral every 6 hours PRN Moderate Pain (4 - 6)  oxyCODONE    IR 10 milliGRAM(s) Oral every 6 hours PRN Severe Pain (7 - 10)      FAMILY HISTORY:      SOCIAL HISTORY:  CIGARETTES: ex heavy smoker  ALCOHOL: social  DRUGS: no                      REVIEW OF SYSTEMS:  CONSTITUTIONAL: No distress, Looks stable  NECK: No pain  RESPIRATORY: No cough, wheezing, shortness of breath  CARDIOVASCULAR: No more chest pain, no SOB, palpitations, leg swelling  GASTROINTESTINAL: No abdominal or epigastric pain. No nausea, vomiting, or hematemesis;  No melena.  NEUROLOGICAL: No dizziness, headaches, memory loss, loss of strength  SKIN: No itching, burning, rashes, or lesions   ENDOCRINE: No heat or cold intolerance  MUSCULOSKELETAL: No joint pain, No  swelling; No muscle pain  PSYCHIATRIC: No depression, anxiety,  ALLERGY: No hives, itching, rash          Vital Signs Last 24 Hrs  T(C): 37.8 (02 Jan 2019 16:00), Max: 37.8 (02 Jan 2019 16:00)  T(F): 100 (02 Jan 2019 16:00), Max: 100 (02 Jan 2019 16:00)  HR: 76 (02 Jan 2019 16:00) (66 - 90)  BP: 113/65 (01 Jan 2019 20:31) (113/65 - 131/48)  BP(mean): 83 (01 Jan 2019 20:31) (83 - 83)  RR: 27 (02 Jan 2019 16:00) (16 - 43)  SpO2: 92% (02 Jan 2019 16:00) (91% - 96%)                      PHYSICAL EXAM:  GENERAL: No respiratory distress, in supine position in the bed  HEAD:  Atraumatic, Normocephalic  NECK: Supple, No JVD, No Bruit   NERVOUS SYSTEM:  Alert, Awake, Oriented to time, place, person; Normal memory and speech; Normal motor Strength 5/5 B/L upper and lower extremities  CHEST/LUNG: Normal air entry to lung base bilaterally; No wheeze, crackle, rales, rhonchi  HEART: Regular heart beat, S1, A2, P2, No S3, No S4, No gallop, No murmur  ABDOMEN: Soft, Non tender, Non distended; Bowel sounds present  EXTREMITIES:  1+ Peripheral Pulses, No clubbing, No edema  SKIN: No rashes or lesions    TELEMETRY: NSR    ECG: < from: 12 Lead ECG (01.01.19 @ 18:17) >  Sinus rhythm with 1st degree A-V block  Right bundle branch block  Left anterior fascicular block  *** Bifascicular block ***  Abnormal ECG    < end of copied text >  NO CHANGE IN ECG FROM THE BASE, CHRONIC BIFASCICULAR BLOCK    I&O's Detail    01 Jan 2019 07:01  -  02 Jan 2019 07:00  --------------------------------------------------------  IN:    esmolol Infusion: 330 mL    sodium chloride 0.9%: 825 mL  Total IN: 1155 mL    OUT:    Voided: 800 mL  Total OUT: 800 mL    Total NET: 355 mL      02 Jan 2019 07:01  -  02 Jan 2019 17:51  --------------------------------------------------------  IN:    esmolol Infusion: 169 mL    sodium chloride 0.9%: 150 mL  Total IN: 319 mL    OUT:    Voided: 300 mL  Total OUT: 300 mL    Total NET: 19 mL          LABS:                        14.1   10.30 )-----------( 172      ( 02 Jan 2019 00:21 )             41.4     01-02    141  |  101  |  14  ----------------------------<  124<H>  4.5   |  25  |  1.3    Ca    9.2      02 Jan 2019 00:21  Phos  3.2     01-02  Mg     1.8     01-02    TPro  7.6  /  Alb  4.4  /  TBili  1.2  /  DBili  x   /  AST  24  /  ALT  20  /  AlkPhos  74  01-01    CARDIAC MARKERS ( 01 Jan 2019 12:15 )  x     / <0.01 ng/mL / x     / x     / x          PT/INR - ( 02 Jan 2019 00:21 )   PT: 13.80 sec;   INR: 1.20 ratio         PTT - ( 02 Jan 2019 00:21 )  PTT:32.9 sec    I&O's Summary    01 Jan 2019 07:01  -  02 Jan 2019 07:00  --------------------------------------------------------  IN: 1155 mL / OUT: 800 mL / NET: 355 mL    02 Jan 2019 07:01  -  02 Jan 2019 17:51  --------------------------------------------------------  IN: 319 mL / OUT: 300 mL / NET: 19 mL        RADIOLOGY & ADDITIONAL STUDIES: < from: Xray Chest 1 View- PORTABLE-Routine (01.02.19 @ 05:41) >  Stable cardiomediastinal silhouette.     < end of copied text >  < from: CT Angio Chest Dissection Protocol (01.01.19 @ 14:20) >  *Focal outpouching of the descending thoracic aorta measuring up to 2.5   cm at the level of the T6 vertebral body with a descending thoracic aorta   intramural hematoma. These findings are consistent with a penetrating   atherosclerotic ulcer versus a focal aortic dissection.    Status postaortic root aneurysm repair, with diffuse aneurysmal changes   of the intrathoracic and intra-abdominal aorta as measured above.    *Dr. Megha Romero discussed preliminary findings with MARISOL MCCRARY MD   on 1/1/2019 3:20 PM with readback.  I have reviewed the above preliminary report following comment-the cystic   lesion seen in the right upper lobe has grown significantly since the   previous exam of 2012 and has an atypical appearance for bulla/bleb   formation and may represent a cystic neoplasm. PET imaging is suggested   to look for biological activity in the wall or within the cystic lesion   or contiguous to it. A call back request was submitted    < end of copied text >

## 2019-01-03 PROCEDURE — 99233 SBSQ HOSP IP/OBS HIGH 50: CPT

## 2019-01-03 RX ORDER — LABETALOL HCL 100 MG
10 TABLET ORAL ONCE
Qty: 0 | Refills: 0 | Status: COMPLETED | OUTPATIENT
Start: 2019-01-03 | End: 2019-01-03

## 2019-01-03 RX ORDER — LOSARTAN POTASSIUM 100 MG/1
25 TABLET, FILM COATED ORAL ONCE
Qty: 0 | Refills: 0 | Status: COMPLETED | OUTPATIENT
Start: 2019-01-03 | End: 2019-01-03

## 2019-01-03 RX ORDER — LABETALOL HCL 100 MG
100 TABLET ORAL ONCE
Qty: 0 | Refills: 0 | Status: COMPLETED | OUTPATIENT
Start: 2019-01-03 | End: 2019-01-03

## 2019-01-03 RX ORDER — LIDOCAINE 4 G/100G
30 CREAM TOPICAL ONCE
Qty: 0 | Refills: 0 | Status: COMPLETED | OUTPATIENT
Start: 2019-01-03 | End: 2019-01-03

## 2019-01-03 RX ORDER — LOSARTAN POTASSIUM 100 MG/1
25 TABLET, FILM COATED ORAL DAILY
Qty: 0 | Refills: 0 | Status: DISCONTINUED | OUTPATIENT
Start: 2019-01-03 | End: 2019-01-03

## 2019-01-03 RX ORDER — LOSARTAN POTASSIUM 100 MG/1
25 TABLET, FILM COATED ORAL DAILY
Qty: 0 | Refills: 0 | Status: DISCONTINUED | OUTPATIENT
Start: 2019-01-04 | End: 2019-01-04

## 2019-01-03 RX ORDER — SIMVASTATIN 20 MG/1
10 TABLET, FILM COATED ORAL AT BEDTIME
Qty: 0 | Refills: 0 | Status: DISCONTINUED | OUTPATIENT
Start: 2019-01-03 | End: 2019-01-04

## 2019-01-03 RX ORDER — POTASSIUM PHOSPHATE, MONOBASIC POTASSIUM PHOSPHATE, DIBASIC 236; 224 MG/ML; MG/ML
15 INJECTION, SOLUTION INTRAVENOUS ONCE
Qty: 0 | Refills: 0 | Status: COMPLETED | OUTPATIENT
Start: 2019-01-03 | End: 2019-01-03

## 2019-01-03 RX ORDER — LABETALOL HCL 100 MG
200 TABLET ORAL EVERY 8 HOURS
Qty: 0 | Refills: 0 | Status: DISCONTINUED | OUTPATIENT
Start: 2019-01-03 | End: 2019-01-04

## 2019-01-03 RX ADMIN — PANTOPRAZOLE SODIUM 40 MILLIGRAM(S): 20 TABLET, DELAYED RELEASE ORAL at 06:19

## 2019-01-03 RX ADMIN — LIDOCAINE 30 MILLILITER(S): 4 CREAM TOPICAL at 02:35

## 2019-01-03 RX ADMIN — Medication 200 MILLIGRAM(S): at 14:03

## 2019-01-03 RX ADMIN — Medication 200 MILLIGRAM(S): at 21:51

## 2019-01-03 RX ADMIN — OXYCODONE HYDROCHLORIDE 10 MILLIGRAM(S): 5 TABLET ORAL at 08:12

## 2019-01-03 RX ADMIN — Medication 10 MILLIGRAM(S): at 04:40

## 2019-01-03 RX ADMIN — Medication 100 MILLIGRAM(S): at 06:34

## 2019-01-03 RX ADMIN — Medication 100 MILLIGRAM(S): at 12:53

## 2019-01-03 RX ADMIN — Medication 10 MILLIGRAM(S): at 02:34

## 2019-01-03 RX ADMIN — OXYCODONE HYDROCHLORIDE 10 MILLIGRAM(S): 5 TABLET ORAL at 00:45

## 2019-01-03 RX ADMIN — OXYCODONE HYDROCHLORIDE 10 MILLIGRAM(S): 5 TABLET ORAL at 00:18

## 2019-01-03 RX ADMIN — POTASSIUM PHOSPHATE, MONOBASIC POTASSIUM PHOSPHATE, DIBASIC 62.5 MILLIMOLE(S): 236; 224 INJECTION, SOLUTION INTRAVENOUS at 14:45

## 2019-01-03 RX ADMIN — OXYCODONE HYDROCHLORIDE 10 MILLIGRAM(S): 5 TABLET ORAL at 10:22

## 2019-01-03 RX ADMIN — Medication 30 MILLILITER(S): at 02:35

## 2019-01-03 RX ADMIN — OXYCODONE HYDROCHLORIDE 5 MILLIGRAM(S): 5 TABLET ORAL at 12:55

## 2019-01-03 RX ADMIN — LOSARTAN POTASSIUM 25 MILLIGRAM(S): 100 TABLET, FILM COATED ORAL at 12:53

## 2019-01-03 RX ADMIN — OXYCODONE HYDROCHLORIDE 5 MILLIGRAM(S): 5 TABLET ORAL at 13:30

## 2019-01-03 RX ADMIN — SIMVASTATIN 10 MILLIGRAM(S): 20 TABLET, FILM COATED ORAL at 21:52

## 2019-01-03 RX ADMIN — CHLORHEXIDINE GLUCONATE 1 APPLICATION(S): 213 SOLUTION TOPICAL at 06:19

## 2019-01-03 NOTE — PROGRESS NOTE ADULT - ASSESSMENT
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 Avila in 2012,  presents with 2 hours of substernal chest pain that radiated to his mid back.  Now still having midsternal CP, described as tight, squeezing. CT w/ dissection protocol demonstrating Focal outpouching of the descending thoracic aorta measuring up to 2.5 cm at the level of the T6 consistent with a penetrating atherosclerotic ulcer.     PLAN:    Neurologic: AAO x3, Pain control with tylenol and oxycodone prn    Respiratory: Saturating well on RA.     Cardiovascular: [place] in place, weaned off of esmolol and on labetalol 100 Q8h. SBPs 120s-140s, given push 1x 5 metoprolol IV then 2 x 10labetalol overnight.  Consider increasing labetalol to 200q8 or add on losartan per cardiology recommendations. Also start statin today   h/o AVR  - CTA yesterday < from: CT Angio Chest Dissection Protocol (01.02.19 @ 17:27) >  IMPRESSION:  1.  Post repair of aortic root aneurysm with stable enlargement of the distal ascending aorta, aortic arch, descending thoracic aorta and   abdominal aorta.  2.  Unchanged Descending Penetrating Thoracic Aortic Ulcer with Intramural hematoma  3.  New bilateral left pleural effusion and increasing left para-aortic atelectasis.  4.  Left perihilar 1.7 cm nodule.  - ECHO ordered, not completed. Follow up     Gastrointestinal/Nutrition:  GI Prophylaxis w/ Protonix 40mg Daily. Epigastric pain, given maalox and viscious lidocaine overnight. Consider GI and possible EGD for pain workup, ?ulcer. On DASH diet, DC IVF this morning    Renal/Genitourinary: Strict I&O, Replete electrolytes PRN- give 15mmol K phos now. UOP 1100, Cr 1.3, stable    Hematologic: Hb stable 15.7-> 14.1-> 12.9.  Serial CBCs. INR 1.35    Infectious Disease: No Acute Dx, afebrile T max 100. WBC 10.3 (10.3)    Endocrine: No Acute Dx    Musculoskeletal: Bedrest.    Lines/Tubes: PIV, Rt Rad [place]    Disposition: downgrade to floor ASSESSMENT:  78M with PMHx of  8cm aortic root aneurysm s/p repair w/ supra-coronary prosthetic vascular conduit by Dr. Avila in 2012,  presents with 2 hours of substernal chest pain that radiated to his mid back.  Now still having midsternal CP, described as tight, squeezing. CT w/ dissection protocol demonstrating Focal outpouching of the descending thoracic aorta measuring up to 2.5 cm at the level of the T6 consistent with a penetrating atherosclerotic ulcer.     PLAN:    Neurologic: AAO x3, Pain control with tylenol and oxycodone prn    Respiratory: Saturating well on RA.     Cardiovascular: [place] in place, weaned off of esmolol and on labetalol 100 Q8h. SBPs 120s-140s, given push 1x 5 metoprolol IV then 2 x 10labetalol overnight.  will increas labetalol to 200q8. Also start statin today   h/o AVR  - CTA yesterday < from: CT Angio Chest Dissection Protocol (01.02.19 @ 17:27) >  IMPRESSION:  1.  Post repair of aortic root aneurysm with stable enlargement of the distal ascending aorta, aortic arch, descending thoracic aorta and   abdominal aorta.  2.  Unchanged Descending Penetrating Thoracic Aortic Ulcer with Intramural hematoma  3.  New bilateral left pleural effusion and increasing left para-aortic atelectasis.  4.  Left perihilar 1.7 cm nodule.  - ECHO ordered, not completed. Follow up     Gastrointestinal/Nutrition:  GI Prophylaxis w/ Protonix 40mg Daily. Epigastric pain, given maalox and viscious lidocaine overnight. Consider GI and possible EGD for pain workup, ?ulcer. On DASH diet, DC IVF     Renal/Genitourinary: Strict I&O, Replete electrolytes PRN- give 15mmol K phos now. UOP 1100, Cr 1.3, stable    Hematologic: Hb stable 15.7-> 14.1-> 12.9.  Serial CBCs. INR 1.35    Infectious Disease: No Acute Dx, afebrile T max 100. WBC 10.3 (10.3)    Endocrine: No Acute Dx    Musculoskeletal: Bedrest.    Lines/Tubes: PIV, Rt Rad [place]    Disposition: downgrade to floor

## 2019-01-03 NOTE — PROGRESS NOTE ADULT - ATTENDING COMMENTS
Assessment and plan above were modified and discussed with residents, physician assistants, and nurses.  I have provided 25  min of Critical Care to this patient.
Assessment and plan above were modified and discussed with residents, physician assistants, and nurses.  I have provided 25 min of Critical Care to this patient.

## 2019-01-03 NOTE — PROGRESS NOTE ADULT - SUBJECTIVE AND OBJECTIVE BOX
Subjective:  no new event, no new pain, BP starting going up, now is on 200 mg tid of Labetalol    MEDICATIONS  (STANDING):  chlorhexidine 4% Liquid 1 Application(s) Topical <User Schedule>  labetalol 200 milliGRAM(s) Oral every 8 hours  labetalol 100 milliGRAM(s) Oral once  losartan 25 milliGRAM(s) Oral once  pantoprazole    Tablet 40 milliGRAM(s) Oral before breakfast  potassium phosphate IVPB 15 milliMole(s) IV Intermittent once  simvastatin 10 milliGRAM(s) Oral at bedtime    MEDICATIONS  (PRN):  acetaminophen   Tablet .. 650 milliGRAM(s) Oral every 6 hours PRN Mild Pain (1 - 3)  aluminum hydroxide/magnesium hydroxide/simethicone Suspension 30 milliLiter(s) Oral every 4 hours PRN Dyspepsia  oxyCODONE    IR 5 milliGRAM(s) Oral every 6 hours PRN Moderate Pain (4 - 6)  oxyCODONE    IR 10 milliGRAM(s) Oral every 6 hours PRN Severe Pain (7 - 10)            Vital Signs Last 24 Hrs  T(C): 37 (03 Jan 2019 08:00), Max: 37.8 (02 Jan 2019 16:00)  T(F): 98.6 (03 Jan 2019 08:00), Max: 100 (02 Jan 2019 16:00)  HR: 74 (03 Jan 2019 11:01) (70 - 90)  BP: 116/65 (03 Jan 2019 08:00) (116/65 - 116/65)  BP(mean): 82 (03 Jan 2019 08:00) (82 - 82)  RR: 21 (03 Jan 2019 11:01) (20 - 35)  SpO2: 93% (03 Jan 2019 11:01) (88% - 96%)             REVIEW OF SYSTEMS:  CONSTITUTIONAL: no fever, no chills, no diaphoresis  CARDIOLOGY: no chest pain, no SOB, no palpitation, no diaphoresis, no faint   RESPIRATORY: no dyspnea, no wheeze, no orthopnea, no PND   NEUROLOGICAL: no dizziness, headache  GI: no abdominal pain, no dyspepsia, no nausea, no vomiting, no diarrhea.    HEENT: no congestion, no nasal bleeding               PHYSICAL EXAM:  · CONSTITUTIONAL: in no respiratory distress   . NECK: Supple, no JVD   · RESPIRATORY: Normal air entry to lung base, no wheeze, no crackle, no wet rales  · CARDIOVASCULAR: S1, A2, P2, no murmur, no click, regular rate,  no rub,  · EXTREMITIES: No cyanosis, no clubbing, no edema  · VASCULAR: Pulses are regular, equal, bilateral in upper and lower extremities  	  TELEMETRY: NSR    ECG: < from: 12 Lead ECG (01.01.19 @ 18:17) >  Sinus rhythm with 1st degree A-V block  Right bundle branch block  Left anterior fascicular block    < end of copied text >      TTE:     LABS:                        12.9   10.31 )-----------( 156      ( 02 Jan 2019 23:10 )             38.2     01-02    136  |  98  |  16  ----------------------------<  125<H>  4.2   |  25  |  1.3    Ca    8.6      02 Jan 2019 23:10  Phos  2.4     01-02  Mg     2.1     01-02    TPro  7.6  /  Alb  4.4  /  TBili  1.2  /  DBili  x   /  AST  24  /  ALT  20  /  AlkPhos  74  01-01    CARDIAC MARKERS ( 01 Jan 2019 12:15 )  x     / <0.01 ng/mL / x     / x     / x          PT/INR - ( 02 Jan 2019 23:10 )   PT: 15.50 sec;   INR: 1.35 ratio         PTT - ( 02 Jan 2019 23:10 )  PTT:29.0 sec    I&O's Summary    02 Jan 2019 07:01  -  03 Jan 2019 07:00  --------------------------------------------------------  IN: 1369 mL / OUT: 1100 mL / NET: 269 mL    03 Jan 2019 07:01  -  03 Jan 2019 11:20  --------------------------------------------------------  IN: 75 mL / OUT: 200 mL / NET: -125 mL      BNP  RADIOLOGY & ADDITIONAL STUDIES:    IMPRESSION AND PLAN:

## 2019-01-03 NOTE — PROGRESS NOTE ADULT - ASSESSMENT
penetrating aortic ulcer, aortic dissection, aortic aneurism  HTN    increase Losartan to 100 mg daily  Labetalol 200 mg BID, keep the /80 or lower  continue with the rest of the medicine

## 2019-01-03 NOTE — PROGRESS NOTE ADULT - SUBJECTIVE AND OBJECTIVE BOX
KAYLA SHELLEY  [id number]  78y Male    Indication for ICU admission: Hemodynamic monitoring and BP control for descending thoracic aortic penetrating atherosclerotic ulcer  Admit Date:01-01-19  ICU Date: 01-01-19    Allergies NKDA    PAST MEDICAL & SURGICAL HISTORY:  8cm aortic root aneurysm s/p repair w/ supra-coronary prosthetic vascular conduit by Dr. Avila in 2012    HOME MEDICATIONS: None    24HRS EVENT:    Neurologic: AAO x3, d/c'd dilaudid, started tylenol/oxy prn  Respiratory: Saturating well on RA. of note CT Chest w/ findings of cystic   lesion seen in the right upper lobe has grown significantly since the   previous exam of 2012 and has an atypical appearance for bulla/bleb   formation and may represent a cystic neoplasm    Cardiovascular: Penn Laird in place, started labetalol 100mg q8, weaned esmalol gtt off w/ SBP goal of <130. ECHO ordered, hx of AVR  5 of metoprolol pushed  -Cardio- Dr Willis is following   -Repeat CTA done-stable as per vascular, no intervention  Gastrointestinal/Nutrition: NPO, change IVF to D5NS @75, PPI  Renal/Genitourinary: Strict I&O, Replete electrolytes PRN  Hematologic: Hb stable 15.7, Serial CBCs. F/U Coags.   Infectious Disease: No Acute Dx, afebrile, WBC 10  Endocrine: No Acute Dx, Monitor FS q6h   Musculoskeletal: OOB    DVT Prophylaxis: SCD's  GI Prophylaxis: Protonix 40mg Daily     *** Tubes/Lines/Drains  ***  [X] Peripheral IV  [X] Arterial Line		[x] R		[x] Rad	Date Placed:  1-1-19      REVIEW OF SYSTEMS    [x] A ten-point review of systems was otherwise negative except as noted.  [ ] Due to altered mental status/intubation, subjective information were not able to be obtained from the patient. History was obtained, to the extent possible, from review of the chart and collateral sources of information. KAYLA SHELLEY  [id number]  78y Male    Indication for ICU admission: Hemodynamic monitoring and BP control for descending thoracic aortic penetrating atherosclerotic ulcer  Admit Date:01-01-19  ICU Date: 01-01-19    Allergies NKDA    PAST MEDICAL & SURGICAL HISTORY:  8cm aortic root aneurysm s/p repair w/ supra-coronary prosthetic vascular conduit by Dr. Avila in 2012    HOME MEDICATIONS: None    24HRS EVENT:    Neurologic: AAO x3, d/c'd dilaudid, started tylenol/oxy prn  Respiratory: Saturating well on RA. 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 ECHO ordered, hx of AVR  5 of metoprolol pushed  -Cardio- Dr Willis is following   -Repeat CTA done-stable as per vascular, no intervention  Gastrointestinal/Nutrition: NPO, change IVF to D5NS @75, PPI  Renal/Genitourinary: Strict I&O, Replete electrolytes PRN  Hematologic: Hb stable 15.7, Serial CBCs. F/U Coags.   Infectious Disease: No Acute Dx, afebrile, WBC 10  Endocrine: No Acute Dx, Monitor FS q6h   Musculoskeletal: OOB    DVT Prophylaxis: SCD's  GI Prophylaxis: Protonix 40mg Daily     *** Tubes/Lines/Drains  ***  [X] Peripheral IV  [X] Arterial Line		[x] R		[x] Rad	Date Placed:  1-1-19      REVIEW OF SYSTEMS    [x] A ten-point review of systems was otherwise negative except as noted.  [ ] Due to altered mental status/intubation, subjective information were not able to be obtained from the patient. History was obtained, to the extent possible, from review of the chart and collateral sources of information. KAYLA SHELLEY  [id number]  78y Male    Indication for ICU admission: Hemodynamic monitoring and BP control for descending thoracic aortic penetrating atherosclerotic ulcer  Admit Date:19  ICU Date: 19    Allergies NKDA    PAST MEDICAL & SURGICAL HISTORY:  8cm aortic root aneurysm s/p repair w/ supra-coronary prosthetic vascular conduit by Dr. Avila in     HOME MEDICATIONS: None    24HRS EVENT:    Neurologic: AAO x3, d/c'd dilaudid, started tylenol/oxy prn  Respiratory: Saturating well on RA. of note CT Chest w/ findings of cystic   lesion seen in the right upper lobe has grown significantly since the   previous exam of  and has an atypical appearance for bulla/bleb   formation and may represent a cystic neoplasm    Cardiovascular: Maru in place, started labetalol 100mg q8, weaned esmalol gtt off w/ SBP goal of <130. ECHO ordered, hx of AVR  5 of metoprolol pushed  -Cardio- Dr Willis is following   -Repeat CTA done-stable as per vascular, no intervention  Gastrointestinal/Nutrition: NPO, change IVF to D5NS @75, PPI  Renal/Genitourinary: Strict I&O, Replete electrolytes PRN  Hematologic: Hb stable 15.7, Serial CBCs. F/U Coags.   Infectious Disease: No Acute Dx, afebrile, WBC 10  Endocrine: No Acute Dx, Monitor FS q6h   Musculoskeletal: OOB    DVT Prophylaxis: SCD's  GI Prophylaxis: Protonix 40mg Daily     *** Tubes/Lines/Drains  ***  [X] Peripheral IV  [X] Arterial Line		[x] R		[x] Rad	Date Placed:  19      REVIEW OF SYSTEMS    [x] A ten-point review of systems was otherwise negative except as noted.  [ ] Due to altered mental status/intubation, subjective information were not able to be obtained from the patient. History was obtained, to the extent possible, from review of the chart and collateral sources of information.      Daily     Daily Weight in k.3 (2019 07:00)    Diet, DASH/TLC:   Sodium & Cholesterol Restricted (19 @ 17:46)      CURRENT MEDS:  Neurologic Medications  acetaminophen   Tablet .. 650 milliGRAM(s) Oral every 6 hours PRN Mild Pain (1 - 3)  oxyCODONE    IR 5 milliGRAM(s) Oral every 6 hours PRN Moderate Pain (4 - 6)  oxyCODONE    IR 10 milliGRAM(s) Oral every 6 hours PRN Severe Pain (7 - 10)    Respiratory Medications    Cardiovascular Medications  labetalol 100 milliGRAM(s) Oral every 8 hours    Gastrointestinal Medications  aluminum hydroxide/magnesium hydroxide/simethicone Suspension 30 milliLiter(s) Oral every 4 hours PRN Dyspepsia  dextrose 5% + sodium chloride 0.9%. 1000 milliLiter(s) IV Continuous <Continuous>  pantoprazole    Tablet 40 milliGRAM(s) Oral before breakfast    Genitourinary Medications    Hematologic/Oncologic Medications    Antimicrobial/Immunologic Medications    Endocrine/Metabolic Medications    Topical/Other Medications  chlorhexidine 4% Liquid 1 Application(s) Topical <User Schedule>      ICU Vital Signs Last 24 Hrs  T(C): 37 (2019 04:00), Max: 37.8 (2019 16:00)  T(F): 98.6 (2019 04:00), Max: 100 (2019 16:00)  HR: 72 (2019 07:00) (66 - 90)  BP: --  BP(mean): --  ABP: 142/66 (2019 07:00) (118/48 - 160/74)  ABP(mean): 92 (2019 07:00) (72 - 108)  RR: 26 (2019 07:00) (18 - 43)  SpO2: 92% (2019 07:00) (88% - 96%)            I&O's Summary    2019 07:  -  2019 07:00  --------------------------------------------------------  IN: 1369 mL / OUT: 1100 mL / NET: 269 mL      I&O's Detail    2019 07:01  -  2019 07:00  --------------------------------------------------------  IN:    dextrose 5% + sodium chloride 0.9%.: 1050 mL    esmolol Infusion: 169 mL    sodium chloride 0.9%: 150 mL  Total IN: 1369 mL    OUT:    Voided: 1100 mL  Total OUT: 1100 mL    Total NET: 269 mL          PHYSICAL EXAM:    General/Neuro  RASS:             GCS:     = E   / V   / M      Deficits:                             alert & oriented x 3, no focal deficits  Pupils:    Lungs:      clear to auscultation, Normal expansion/effort.     Cardiovascular : S1, S2.  Regular rate and rhythm.  Peripheral edema   Cardiac Rhythm: Normal Sinus Rhythm    GI: Abdomen soft, Non-tender, Non-distended.    Gastrostomy / Jejunostomy tube in place.  Nasogastric tube in place.  Colostomy / Ileostomy.    Wound:    Extremities: Extremities warm, pink, well-perfused. Pulses:Rt     Lt    Derm: Good skin turgor, no skin breakdown.      :       Chapman catheter in place.      CXR:     LABS:  CAPILLARY BLOOD GLUCOSE                              12.9   10.31 )-----------( 156      ( 2019 23:10 )             38.2       -    136  |  98  |  16  ----------------------------<  125<H>  4.2   |  25  |  1.3    Ca    8.6      2019 23:10  Phos  2.4     -  Mg     2.1     -    TPro  7.6  /  Alb  4.4  /  TBili  1.2  /  DBili  x   /  AST  24  /  ALT  20  /  AlkPhos  74  01-      PT/INR - ( 2019 23:10 )   PT: 15.50 sec;   INR: 1.35 ratio         PTT - ( 2019 23:10 )  PTT:29.0 sec  CARDIAC MARKERS ( 2019 12:15 )  x     / <0.01 ng/mL / x     / x     / x KAYLA SHELLEY  [id number]  78y Male    Indication for ICU admission: Hemodynamic monitoring and BP control for descending thoracic aortic penetrating atherosclerotic ulcer  Admit Date:19  ICU Date: 19    Allergies NKDA    PAST MEDICAL & SURGICAL HISTORY:  8cm aortic root aneurysm s/p repair w/ supra-coronary prosthetic vascular conduit by Dr. Avila in     HOME MEDICATIONS: None    24HRS EVENT:    Neurologic: AAO x3, d/c'd dilaudid, started tylenol/oxy prn  Respiratory: Saturating well on RA. of note CT Chest w/ findings of cystic   lesion seen in the right upper lobe has grown significantly since the   previous exam of  and has an atypical appearance for bulla/bleb   formation and may represent a cystic neoplasm    Cardiovascular: Maru in place, started labetalol 100mg q8, weaned esmalol gtt off w/ SBP goal of <130. ECHO ordered, hx of AVR  5 of metoprolol pushed  -Cardio- Dr Willis is following   -Repeat CTA done-stable as per vascular, no intervention  Gastrointestinal/Nutrition: NPO, change IVF to D5NS @75, PPI  Renal/Genitourinary: Strict I&O, Replete electrolytes PRN  Hematologic: Hb stable 15.7, Serial CBCs. F/U Coags.   Infectious Disease: No Acute Dx, afebrile, WBC 10  Endocrine: No Acute Dx, Monitor FS q6h   Musculoskeletal: OOB    DVT Prophylaxis: SCD's  GI Prophylaxis: Protonix 40mg Daily     *** Tubes/Lines/Drains  ***  [X] Peripheral IV  [X] Arterial Line		[x] R		[x] Rad	Date Placed:  19      REVIEW OF SYSTEMS    [x] A ten-point review of systems was otherwise negative except as noted.  [ ] Due to altered mental status/intubation, subjective information were not able to be obtained from the patient. History was obtained, to the extent possible, from review of the chart and collateral sources of information.      Daily     Daily Weight in k.3 (2019 07:00)    Diet, DASH/TLC:   Sodium & Cholesterol Restricted (19 @ 17:46)      CURRENT MEDS:  Neurologic Medications  acetaminophen   Tablet .. 650 milliGRAM(s) Oral every 6 hours PRN Mild Pain (1 - 3)  oxyCODONE    IR 5 milliGRAM(s) Oral every 6 hours PRN Moderate Pain (4 - 6)  oxyCODONE    IR 10 milliGRAM(s) Oral every 6 hours PRN Severe Pain (7 - 10)    Respiratory Medications    Cardiovascular Medications  labetalol 100 milliGRAM(s) Oral every 8 hours    Gastrointestinal Medications  aluminum hydroxide/magnesium hydroxide/simethicone Suspension 30 milliLiter(s) Oral every 4 hours PRN Dyspepsia  dextrose 5% + sodium chloride 0.9%. 1000 milliLiter(s) IV Continuous <Continuous>  pantoprazole    Tablet 40 milliGRAM(s) Oral before breakfast    Genitourinary Medications    Hematologic/Oncologic Medications    Antimicrobial/Immunologic Medications    Endocrine/Metabolic Medications    Topical/Other Medications  chlorhexidine 4% Liquid 1 Application(s) Topical <User Schedule>      ICU Vital Signs Last 24 Hrs  T(C): 37 (2019 04:00), Max: 37.8 (2019 16:00)  T(F): 98.6 (2019 04:00), Max: 100 (2019 16:00)  HR: 72 (2019 07:00) (66 - 90)  BP: --  BP(mean): --  ABP: 142/66 (2019 07:00) (118/48 - 160/74)  ABP(mean): 92 (2019 07:00) (72 - 108)  RR: 26 (2019 07:00) (18 - 43)  SpO2: 92% (2019 07:00) (88% - 96%)            I&O's Summary    2019 07:  -  2019 07:00  --------------------------------------------------------  IN: 1369 mL / OUT: 1100 mL / NET: 269 mL      I&O's Detail    2019 07:01  -  2019 07:00  --------------------------------------------------------  IN:    dextrose 5% + sodium chloride 0.9%.: 1050 mL    esmolol Infusion: 169 mL    sodium chloride 0.9%: 150 mL  Total IN: 1369 mL    OUT:    Voided: 1100 mL  Total OUT: 1100 mL    Total NET: 269 mL          PHYSICAL EXAM:    General/Neuro  RASS:    0         GCS: 15     Deficits:    alert & oriented x 3, no focal deficits  Pupils: equal, symmetric    Lungs:      clear to auscultation, Normal expansion/effort.     Cardiovascular : S1, S2.  Regular rate and rhythm.  No noted peripheral edema     GI: Abdomen soft, Non-tender, Non-distended. Rounded. + BS       Extremities: Extremities warm, pink, well-perfused. Pulses: palpable DP BL    Derm: Good skin turgor, no skin breakdown.      :      voiding    CXR:     LABS:  CAPILLARY BLOOD GLUCOSE                              12.9   10.31 )-----------( 156      ( 2019 23:10 )             38.2       -    136  |  98  |  16  ----------------------------<  125<H>  4.2   |  25  |  1.3    Ca    8.6      2019 23:10  Phos  2.4     -  Mg     2.1     -    TPro  7.6  /  Alb  4.4  /  TBili  1.2  /  DBili  x   /  AST  24  /  ALT  20  /  AlkPhos  74  -      PT/INR - ( 2019 23:10 )   PT: 15.50 sec;   INR: 1.35 ratio         PTT - ( 2019 23:10 )  PTT:29.0 sec  CARDIAC MARKERS ( 2019 12:15 )  x     / <0.01 ng/mL / x     / x     / x

## 2019-01-03 NOTE — CHART NOTE - NSCHARTNOTEFT_GEN_A_CORE
Alberteduardo Luz Maria    77 y/o male, HD#3, admitted for finding of 2.5 cm desc thoracic aortic penetrating atherosclerotic ulcer. Pt was on an esmalol gtt that was d/c'd yesterday after initiating labetalol 100mg q8. Overnight pt was hypertensive to 140s, received metoprolol 5mg x1, labetalol 10mgx2. In am labetalol increased to 200mg q8.   -pain control w/ tylenol/oxy  -on RA, sating well  -Goal BP <130, labetalol 200 mg q8, started simvastatin, losartan, 2D echo done- EF 75%, G1DD, AAA 4.6cm, mild AR.  -IVL, Dash diet, PPI, voiding     f/u  -bp monitoring  -consider GI consult/EGD?  -evening labs    full sign out given to primary team Luz Maria Weaver    77 y/o male, HD#3, admitted for finding of 2.5 cm desc thoracic aortic penetrating atherosclerotic ulcer. Pt was on an esmalol gtt that was d/c'd yesterday after initiating labetalol 100mg q8. Overnight pt was hypertensive to 140s, received metoprolol 5mg x1, labetalol 10mgx2. In am labetalol increased to 200mg q8. Repeat CTA yesterday that was stable   -pain control w/ tylenol/oxy  -on RA, sating well  -Goal BP <130, labetalol 200 mg q8, started simvastatin, losartan, 2D echo done- EF 75%, G1DD, AAA 4.6cm, mild AR, Dr Willis is following .   -IVL, Dash diet, PPI, voiding     f/u  -bp monitoring  -consider GI consult/EGD?  -evening labs    full sign out given to primary team Dr Damon 3:08pm

## 2019-01-04 ENCOUNTER — TRANSCRIPTION ENCOUNTER (OUTPATIENT)
Age: 79
End: 2019-01-04

## 2019-01-04 ENCOUNTER — INBOUND DOCUMENT (OUTPATIENT)
Age: 79
End: 2019-01-04

## 2019-01-04 VITALS
HEART RATE: 87 BPM | RESPIRATION RATE: 18 BRPM | DIASTOLIC BLOOD PRESSURE: 70 MMHG | SYSTOLIC BLOOD PRESSURE: 126 MMHG | TEMPERATURE: 98 F

## 2019-01-04 PROBLEM — Z00.00 ENCOUNTER FOR PREVENTIVE HEALTH EXAMINATION: Status: ACTIVE | Noted: 2019-01-04

## 2019-01-04 LAB
ANION GAP SERPL CALC-SCNC: 15 MMOL/L — HIGH (ref 7–14)
BLD GP AB SCN SERPL QL: SIGNIFICANT CHANGE UP
BUN SERPL-MCNC: 15 MG/DL — SIGNIFICANT CHANGE UP (ref 10–20)
CALCIUM SERPL-MCNC: 8.4 MG/DL — LOW (ref 8.5–10.1)
CHLORIDE SERPL-SCNC: 99 MMOL/L — SIGNIFICANT CHANGE UP (ref 98–110)
CO2 SERPL-SCNC: 23 MMOL/L — SIGNIFICANT CHANGE UP (ref 17–32)
CREAT SERPL-MCNC: 1.3 MG/DL — SIGNIFICANT CHANGE UP (ref 0.7–1.5)
GLUCOSE SERPL-MCNC: 108 MG/DL — HIGH (ref 70–99)
HCT VFR BLD CALC: 37.3 % — LOW (ref 42–52)
HGB BLD-MCNC: 12.5 G/DL — LOW (ref 14–18)
MAGNESIUM SERPL-MCNC: 1.9 MG/DL — SIGNIFICANT CHANGE UP (ref 1.8–2.4)
MCHC RBC-ENTMCNC: 31 PG — SIGNIFICANT CHANGE UP (ref 27–31)
MCHC RBC-ENTMCNC: 33.5 G/DL — SIGNIFICANT CHANGE UP (ref 32–37)
MCV RBC AUTO: 92.6 FL — SIGNIFICANT CHANGE UP (ref 80–94)
NRBC # BLD: 0 /100 WBCS — SIGNIFICANT CHANGE UP (ref 0–0)
PHOSPHATE SERPL-MCNC: 1.9 MG/DL — LOW (ref 2.1–4.9)
PLATELET # BLD AUTO: 145 K/UL — SIGNIFICANT CHANGE UP (ref 130–400)
POTASSIUM SERPL-MCNC: 4.6 MMOL/L — SIGNIFICANT CHANGE UP (ref 3.5–5)
POTASSIUM SERPL-SCNC: 4.6 MMOL/L — SIGNIFICANT CHANGE UP (ref 3.5–5)
RBC # BLD: 4.03 M/UL — LOW (ref 4.7–6.1)
RBC # FLD: 12.6 % — SIGNIFICANT CHANGE UP (ref 11.5–14.5)
SODIUM SERPL-SCNC: 137 MMOL/L — SIGNIFICANT CHANGE UP (ref 135–146)
TYPE + AB SCN PNL BLD: SIGNIFICANT CHANGE UP
WBC # BLD: 9.8 K/UL — SIGNIFICANT CHANGE UP (ref 4.8–10.8)
WBC # FLD AUTO: 9.8 K/UL — SIGNIFICANT CHANGE UP (ref 4.8–10.8)

## 2019-01-04 RX ORDER — SIMVASTATIN 20 MG/1
1 TABLET, FILM COATED ORAL
Qty: 0 | Refills: 0 | COMMUNITY
Start: 2019-01-04

## 2019-01-04 RX ORDER — LABETALOL HCL 100 MG
1 TABLET ORAL
Qty: 90 | Refills: 1 | OUTPATIENT
Start: 2019-01-04 | End: 2019-03-04

## 2019-01-04 RX ORDER — PANTOPRAZOLE SODIUM 20 MG/1
1 TABLET, DELAYED RELEASE ORAL
Qty: 30 | Refills: 0 | OUTPATIENT
Start: 2019-01-04 | End: 2019-02-02

## 2019-01-04 RX ORDER — LOSARTAN POTASSIUM 100 MG/1
1 TABLET, FILM COATED ORAL
Qty: 0 | Refills: 0 | COMMUNITY
Start: 2019-01-04

## 2019-01-04 RX ADMIN — Medication 650 MILLIGRAM(S): at 13:09

## 2019-01-04 RX ADMIN — Medication 200 MILLIGRAM(S): at 05:23

## 2019-01-04 RX ADMIN — PANTOPRAZOLE SODIUM 40 MILLIGRAM(S): 20 TABLET, DELAYED RELEASE ORAL at 05:24

## 2019-01-04 RX ADMIN — LOSARTAN POTASSIUM 25 MILLIGRAM(S): 100 TABLET, FILM COATED ORAL at 05:23

## 2019-01-04 RX ADMIN — Medication 200 MILLIGRAM(S): at 13:09

## 2019-01-04 NOTE — DISCHARGE NOTE ADULT - PATIENT PORTAL LINK FT
You can access the [facility], offered by [facility], by registering with the following website: [email or web address]

## 2019-01-04 NOTE — PROGRESS NOTE ADULT - SUBJECTIVE AND OBJECTIVE BOX
Patient is a 78y old  Male who presents with a chief complaint of aortic dissection and penetrating ulcer, uncontrolled HTN (2019 11:19)    PAST MEDICAL & SURGICAL HISTORY:  No pertinent past medical history  History of aortic valve repair      Events of the Last 24h:blood pressure under control  Vital Signs Last 24 Hrs  T(C): 37.8 (2019 00:00), Max: 37.8 (2019 16:50)  T(F): 100 (2019 00:00), Max: 100.1 (2019 16:50)  HR: 86 (2019 00:00) (72 - 90)  BP: 106/61 (2019 00:00) (106/61 - 145/59)  BP(mean): 82 (2019 16:00) (77 - 100)  RR: 18 (2019 00:00) (18 - 34)  SpO2: 92% (2019 16:00) (90% - 96%)        Diet, DASH/TLC:   Sodium & Cholesterol Restricted (19 @ 17:46)      I&O's Summary    2019 07:01  -  2019 07:00  --------------------------------------------------------  IN: 1369 mL / OUT: 1100 mL / NET: 269 mL    2019 07:  -  2019 04:38  --------------------------------------------------------  IN: 325 mL / OUT: 500 mL / NET: -175 mL     I&O's Detail    2019 07:  -  2019 07:00  --------------------------------------------------------  IN:    dextrose 5% + sodium chloride 0.9%: 1050 mL    esmolol Infusion: 169 mL    sodium chloride 0.9%: 150 mL  Total IN: 1369 mL    OUT:    Voided: 1100 mL  Total OUT: 1100 mL    Total NET: 269 mL      2019 07:01  -  2019 04:38  --------------------------------------------------------  IN:    dextrose 5% + sodium chloride 0.9%: 75 mL    IV PiggyBack: 250 mL  Total IN: 325 mL    OUT:    Voided: 500 mL  Total OUT: 500 mL    Total NET: -175 mL          MEDICATIONS  (STANDING):  chlorhexidine 4% Liquid 1 Application(s) Topical <User Schedule>  labetalol 200 milliGRAM(s) Oral every 8 hours  losartan 25 milliGRAM(s) Oral daily  pantoprazole    Tablet 40 milliGRAM(s) Oral before breakfast  simvastatin 10 milliGRAM(s) Oral at bedtime    MEDICATIONS  (PRN):  acetaminophen   Tablet .. 650 milliGRAM(s) Oral every 6 hours PRN Mild Pain (1 - 3)  aluminum hydroxide/magnesium hydroxide/simethicone Suspension 30 milliLiter(s) Oral every 4 hours PRN Dyspepsia  oxyCODONE    IR 5 milliGRAM(s) Oral every 6 hours PRN Moderate Pain (4 - 6)  oxyCODONE    IR 10 milliGRAM(s) Oral every 6 hours PRN Severe Pain (7 - 10)      PHYSICAL EXAM:    GENERAL: NAD    HEENT: NCAT    CHEST/LUNGS: CTAB    HEART: RRR,  No murmurs, rubs, or gallops    ABDOMEN: SNTND +BS    EXTREMITIES:  FROM, No clubbing, cyanosis, or edema, palpable pulse    NEURO: No focal neurological deficits    SKIN: No rashes or lesions    INCISION/WOUNDS:                          12.5   9.80  )-----------( 145      ( 2019 00:39 )             37.3        CBC Full  -  ( 2019 00:39 )  WBC Count : 9.80 K/uL  Hemoglobin : 12.5 g/dL  Hematocrit : 37.3 %  Platelet Count - Automated : 145 K/uL  Mean Cell Volume : 92.6 fL  Mean Cell Hemoglobin : 31.0 pg  Mean Cell Hemoglobin Concentration : 33.5 g/dL  Auto Neutrophil # : x  Auto Lymphocyte # : x  Auto Monocyte # : x  Auto Eosinophil # : x  Auto Basophil # : x  Auto Neutrophil % : x  Auto Lymphocyte % : x  Auto Monocyte % : x  Auto Eosinophil % : x  Auto Basophil % : x               137   |  99    |  15                 Ca: 8.4    BMP:   ----------------------------< 108    M.9   (19 @ 00:39)             4.6    |  23    | 1.3                Ph: 1.9      LFT:     TPro: 7.6 / Alb: 4.4 / TBili: 1.2 / DBili: x / AST: 24 / ALT: 20 / AlkPhos: 74   (19 @ 12:15)      PT/INR - ( 2019 23:10 )   PT: 15.50 sec;   INR: 1.35 ratio         PTT - ( 2019 23:10 )  PTT:29.0 sec

## 2019-01-04 NOTE — DISCHARGE NOTE ADULT - CARE PLAN
Principal Discharge DX:	Dissection of thoracic aorta  Goal:	Continued recovery expected  Assessment and plan of treatment:	Please take labetalol 1 tab (100 mg) in the morning and 2 tabs (200 mg) in the evening every day.  Please follow up outpatient in 4 weeks with Dr. Don, an appointment has been made for you on 1/29/19 at 10 am, please call [phone] with any questions.  You may continue regular diet as tolerated.  If you experience worsening chest pain, shortness of breath, dizziness, nausea, vomiting, headache, please return the to ED for further management. Principal Discharge DX:	Dissection of thoracic aorta  Goal:	Continued recovery expected  Assessment and plan of treatment:	Please take labetalol 1 tab (100 mg) in the morning and 2 tabs (200 mg) in the evening every day.  Please follow up outpatient in 4 weeks with Dr. Don, an appointment has been made for you on 1/29/19 at 10 am, please call [phone] with any questions.  You may continue regular diet as tolerated.  If you experience worsening chest pain, shortness of breath, dizziness, nausea, vomiting, headache, please return the to ED for further management.  Secondary Diagnosis:	Reflux gastritis  Goal:	Started on Bentyl and Protonix  Assessment and plan of treatment:	Please, follow up with gastroenterologist Dr. Bird [phone] or [phone]

## 2019-01-04 NOTE — DISCHARGE NOTE ADULT - WITHDRAWAL SYMPTOMS INCLUDE; NEGATIVE MOOD, URGES TO SMOKE, AND DIFFICULTY CONCENTRATING.
Statement Selected
I performed the initial face to face bedside interview with this patient regarding history of present illness, review of symptoms and past medical, social and family history.  I completed an independent physical examination.  I was the initial provider who evaluated this patient.  The history, review of symptoms and examination was documented by the scribe in my presence and I attest to the accuracy of the documentation.  I have signed out the follow up of any pending tests (i.e. labs, radiological studies) to the PA.  I have discussed the patient’s plan of care and disposition with the PA.

## 2019-01-04 NOTE — DISCHARGE NOTE ADULT - HOSPITAL COURSE
Patient is a 78 year old male with PMH of AV repair with Dr. Avila in 2012, presented to the ED on 1/1 complaining of 2 hours of substernal chest pain that radiated to his back.  Patient stated it came on suddenly, started substernal, radiated to his back, and also complained of epigastric pain at the time of interview.  Patient denies shortness of breath, diaphoresis, nausea, vomiting dizziness.  CTA dissection protocol was performed which showed a focal outpouching of the descending thoracic aorta measuring up to 2.5 cm at the level of the T6 vertebral body with a descending thoracic aorta intramural hematoma, consistent with a penetrating atherosclerotic ulcer versus a focal aortic dissection.  Patient was admitted to ICU for close monitoring, started on esmolol drip with SBP parameters 100-130.  Patient stated that his chest pain was resolving, and blood pressure remained between 120s-140s.  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  Patient remained stable and was downgraded from the ICU.  On hospital day 4 patient stated that his chest pain was resolving, stated that he was passing gas and bowel Patient is a 78 year old male with PMH of AV repair with Dr. Avila in 2012, presented to the ED on 1/1 complaining of 2 hours of substernal chest pain that radiated to his back.  Patient stated it came on suddenly, started substernal, radiated to his back, and also complained of epigastric pain at the time of interview.  Patient denies shortness of breath, diaphoresis, nausea, vomiting dizziness.  CTA dissection protocol was performed which showed a focal outpouching of the descending thoracic aorta measuring up to 2.5 cm at the level of the T6 vertebral body with a descending thoracic aorta intramural hematoma, consistent with a penetrating atherosclerotic ulcer versus a focal aortic dissection.  Patient was admitted to ICU for close monitoring, started on esmolol drip with SBP parameters 100-130.  Patient stated that his chest pain was resolving, and blood pressure remained between 120s-140s.  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  Patient remained stable and was downgraded from the ICU.  On hospital day 4 patient stated that his chest pain was resolving, stated that he was passing gas and bowel movements, tolerating regular diet.  Patient will continue taking labetolol 300mg QD for blood pressure control and will follow up with Dr. Don outpatient in 4 weeks, patient has an appointment scheduled on 1/29 at 10am. Patient is a 78 year old male with PMH of AV repair with Dr. Avila in 2012, presented to the ED on 1/1 complaining of 2 hours of substernal chest pain that radiated to his back.  Patient stated it came on suddenly, started substernal, radiated to his back, and also complained of epigastric pain at the time of interview.  Patient denies shortness of breath, diaphoresis, nausea, vomiting dizziness.  CTA dissection protocol was performed which showed a focal outpouching of the descending thoracic aorta measuring up to 2.5 cm at the level of the T6 vertebral body with a descending thoracic aorta intramural hematoma, consistent with a penetrating atherosclerotic ulcer versus a focal aortic dissection.  Patient was admitted to ICU for close monitoring, started on esmolol drip with SBP parameters 100-130.  Patient stated that his chest pain was resolving, and blood pressure remained between 120s-140s.  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  Patient remained stable and was downgraded from the ICU.  On hospital day 4 patient stated that his chest pain was resolving, stated that he was passing gas and bowel movements, tolerating regular diet.  Patient will continue taking labetolol 300mg QD for blood pressure control and will follow up with Dr. Don outpatient in 4 weeks, patient has an appointment scheduled on 1/29 at 10am.  During pt's stay, he was noted to have reflux and belching, KUB c/w gaseous pattern. Started on bentyl and Protonix. Pt was referred to outpt GI evaluation.

## 2019-01-04 NOTE — DISCHARGE NOTE ADULT - CARE PROVIDER_API CALL
Atif Don), Vascular Surgery  [address] [place] [address]  Phone: [phone]  Fax: [phone]

## 2019-01-04 NOTE — DISCHARGE NOTE ADULT - MEDICATION SUMMARY - MEDICATIONS TO TAKE
I will START or STAY ON the medications listed below when I get home from the hospital:    losartan 25 mg oral tablet  -- 1 tab(s) by mouth once a day  -- Indication: For blood pressure    simvastatin 10 mg oral tablet  -- 1 tab(s) by mouth once a day (at bedtime)  -- Indication: For cholesterol    Bentyl 20 mg oral tablet  -- 1 tab(s) by mouth once a day   -- May cause drowsiness.  Alcohol may intensify this effect.  Use care when operating dangerous machinery.    -- Indication: For IBS I will START or STAY ON the medications listed below when I get home from the hospital:    losartan 25 mg oral tablet  -- 1 tab(s) by mouth once a day  -- Indication: For blood pressure    simvastatin 10 mg oral tablet  -- 1 tab(s) by mouth once a day (at bedtime)  -- Indication: For cholesterol    labetalol 100 mg oral tablet  -- Please take 1 tab by mouth in the morning and 2 tabs by mouth in the evening every day  -- It is very important that you take or use this exactly as directed.  Do not skip doses or discontinue unless directed by your doctor.  May cause drowsiness.  Alcohol may intensify this effect.  Use care when operating dangerous machinery.  Some non-prescription drugs may aggravate your condition.  Read all labels carefully.  If a warning appears, check with your doctor before taking.    -- Indication: For AORTIC DISSECTION    Bentyl 20 mg oral tablet  -- 1 tab(s) by mouth once a day   -- May cause drowsiness.  Alcohol may intensify this effect.  Use care when operating dangerous machinery.    -- Indication: For IBS    Protonix 40 mg oral delayed release tablet  -- 1 tab(s) by mouth once a day (before a meal)  -- Indication: For Dyspepsia

## 2019-01-04 NOTE — DISCHARGE NOTE ADULT - ADDITIONAL INSTRUCTIONS
Please take labetalol 1 tab (100 mg) in the morning and 2 tabs (200 mg) in the evening every day.  Please follow up outpatient in 4 weeks with Dr. Don, an appointment has been made for you on 1/29/19 at 10 am, please call [phone] with any questions.  You may continue regular diet as tolerated.  If you experience worsening chest pain, shortness of breath, dizziness, nausea, vomiting, headache, please return the to ED for further management.

## 2019-01-04 NOTE — DISCHARGE NOTE ADULT - PLAN OF CARE
Continued recovery expected Please take labetalol 1 tab (100 mg) in the morning and 2 tabs (200 mg) in the evening every day.  Please follow up outpatient in 4 weeks with Dr. Don, an appointment has been made for you on 1/29/19 at 10 am, please call [phone] with any questions.  You may continue regular diet as tolerated.  If you experience worsening chest pain, shortness of breath, dizziness, nausea, vomiting, headache, please return the to ED for further management. Started on Bentyl and Protonix Please, follow up with gastroenterologist Dr. Bird [phone] or [phone]

## 2019-01-05 ENCOUNTER — RESULT REVIEW (OUTPATIENT)
Age: 79
End: 2019-01-05

## 2019-01-05 ENCOUNTER — EMERGENCY (EMERGENCY)
Facility: HOSPITAL | Age: 79
LOS: 0 days | Discharge: HOME | End: 2019-01-05
Attending: EMERGENCY MEDICINE | Admitting: EMERGENCY MEDICINE

## 2019-01-05 VITALS — OXYGEN SATURATION: 50 %

## 2019-01-05 DIAGNOSIS — Z79.899 OTHER LONG TERM (CURRENT) DRUG THERAPY: ICD-10-CM

## 2019-01-05 DIAGNOSIS — Z98.890 OTHER SPECIFIED POSTPROCEDURAL STATES: Chronic | ICD-10-CM

## 2019-01-05 DIAGNOSIS — Z95.4 PRESENCE OF OTHER HEART-VALVE REPLACEMENT: ICD-10-CM

## 2019-01-05 DIAGNOSIS — I46.9 CARDIAC ARREST, CAUSE UNSPECIFIED: ICD-10-CM

## 2019-01-05 DIAGNOSIS — R14.0 ABDOMINAL DISTENSION (GASEOUS): ICD-10-CM

## 2019-01-05 RX ORDER — SIMVASTATIN 20 MG/1
1 TABLET, FILM COATED ORAL
Qty: 90 | Refills: 3 | OUTPATIENT
Start: 2019-01-05 | End: 2019-12-30

## 2019-01-05 RX ORDER — PANTOPRAZOLE SODIUM 20 MG/1
1 TABLET, DELAYED RELEASE ORAL
Qty: 90 | Refills: 0 | OUTPATIENT
Start: 2019-01-05 | End: 2019-04-04

## 2019-01-05 NOTE — ED PROVIDER NOTE - OBJECTIVE STATEMENT
77 yo M with a hx of aortic root aneurysm s/p AV replacement presents in cardiac arrest. Pt was in the bathroom at home when he passed out and 911 was called. Pt had CPR in the field for 30 minutes, intubated and given epi x1. ACLS in progress on arrival.

## 2019-01-05 NOTE — ED PROVIDER NOTE - PHYSICAL EXAMINATION
CONSTITUTIONAL:   SKIN: warm, dry, cyanotic  HEAD: Normocephalic; atraumatic.  EYES: Pupils fixed  ENT: No nasal discharge. ET tube in place  CARD: no heart sounds  RESP: + mechanical breath sounds b/l   ABD: soft, distended  EXT: cyanotic  NEURO: Unresponsive  PSYCH: Unresponsive

## 2019-01-05 NOTE — ED ADULT NURSE REASSESSMENT NOTE - NS ED NURSE REASSESS COMMENT FT1
Post mortem care provided as patients family states they are not coming back. Transport to [facility]

## 2019-01-05 NOTE — ED PROVIDER NOTE - PROGRESS NOTE DETAILS
discussed with ME, Mary Grace Judge, made aware of pt, ME will review the case. States face sheet needs to be faxed to [phone]

## 2019-01-05 NOTE — ED ADULT NURSE REASSESSMENT NOTE - NS ED NURSE REASSESS COMMENT FT1
Family a bedside,  called  for patient family as per request. Called x 3 and left a voicemail message x 2 for  and left number to contact back

## 2019-01-05 NOTE — ED PROVIDER NOTE - ATTENDING CONTRIBUTION TO CARE
78 M to ED s/p cardiac arrest at home.  pt was in bathroom and collapsed- EMS notified and CPR on arrival started with ACLS in progress on arrival to ED. no ROSC end tidal =<9, no cardiac activity on arrival EMBU and ACLS continued without ROSC, pt pronounced dead at 17:01 with family at bedside.

## 2019-01-05 NOTE — ED ADULT NURSE NOTE - OBJECTIVE STATEMENT
Pt BIBEMS in cardiac arrest. Witnessed at home by family, CPR in field x 30 mins, 1 epi given in field and pt intubated pta.  On arrival to ED CPR in progress and continued, see code flow sheet. [id number]

## 2019-01-08 DIAGNOSIS — Z95.2 PRESENCE OF PROSTHETIC HEART VALVE: ICD-10-CM

## 2019-01-08 DIAGNOSIS — I10 ESSENTIAL (PRIMARY) HYPERTENSION: ICD-10-CM

## 2019-01-08 DIAGNOSIS — R07.9 CHEST PAIN, UNSPECIFIED: ICD-10-CM

## 2019-01-08 DIAGNOSIS — I71.01 DISSECTION OF THORACIC AORTA: ICD-10-CM

## 2019-01-08 DIAGNOSIS — Z28.21 IMMUNIZATION NOT CARRIED OUT BECAUSE OF PATIENT REFUSAL: ICD-10-CM

## 2019-01-08 DIAGNOSIS — I71.2 THORACIC AORTIC ANEURYSM, WITHOUT RUPTURE: ICD-10-CM

## 2019-01-29 ENCOUNTER — APPOINTMENT (OUTPATIENT)
Dept: VASCULAR SURGERY | Facility: CLINIC | Age: 79
End: 2019-01-29

## 2023-05-04 NOTE — ED ADULT TRIAGE NOTE - SOURCE OF INFORMATION
Quality 226: Preventive Care And Screening: Tobacco Use: Screening And Cessation Intervention: Patient screened for tobacco use and is an ex/non-smoker
Quality 130: Documentation Of Current Medications In The Medical Record: Current Medications Documented
Patient
Quality 431: Preventive Care And Screening: Unhealthy Alcohol Use - Screening: Patient not identified as an unhealthy alcohol user when screened for unhealthy alcohol use using a systematic screening method
Detail Level: Detailed
